# Patient Record
Sex: MALE | Race: WHITE | Employment: OTHER | URBAN - METROPOLITAN AREA
[De-identification: names, ages, dates, MRNs, and addresses within clinical notes are randomized per-mention and may not be internally consistent; named-entity substitution may affect disease eponyms.]

---

## 2017-02-10 ENCOUNTER — HOSPITAL ENCOUNTER (OUTPATIENT)
Dept: DIABETES SERVICES | Age: 67
Discharge: HOME | End: 2017-02-10
Payer: MEDICARE

## 2017-02-10 DIAGNOSIS — E11.9 DIABETES MELLITUS WITHOUT COMPLICATION (HCC): ICD-10-CM

## 2017-02-10 PROCEDURE — G0109 DIAB MANAGE TRN IND/GROUP: HCPCS | Performed by: DIETITIAN, REGISTERED

## 2017-02-24 ENCOUNTER — HOSPITAL ENCOUNTER (OUTPATIENT)
Dept: DIABETES SERVICES | Age: 67
Discharge: HOME | End: 2017-02-24
Payer: MEDICARE

## 2017-02-24 DIAGNOSIS — E11.9 DIABETES MELLITUS WITHOUT COMPLICATION (HCC): ICD-10-CM

## 2017-02-24 PROCEDURE — G0109 DIAB MANAGE TRN IND/GROUP: HCPCS | Performed by: DIETITIAN, REGISTERED

## 2017-04-14 ENCOUNTER — HOSPITAL ENCOUNTER (OUTPATIENT)
Dept: DIABETES SERVICES | Age: 67
Discharge: HOME | End: 2017-04-14
Payer: MEDICARE

## 2017-04-14 DIAGNOSIS — E11.9 DIABETES MELLITUS WITHOUT COMPLICATION (HCC): ICD-10-CM

## 2017-04-14 PROCEDURE — G0109 DIAB MANAGE TRN IND/GROUP: HCPCS | Performed by: DIETITIAN, REGISTERED

## 2017-10-06 ENCOUNTER — HOSPITAL ENCOUNTER (OUTPATIENT)
Dept: DIABETES SERVICES | Age: 67
Discharge: HOME | End: 2017-10-06
Payer: MEDICARE

## 2017-10-06 DIAGNOSIS — E11.9 DIABETES MELLITUS WITHOUT COMPLICATION (HCC): ICD-10-CM

## 2017-10-06 PROCEDURE — G0109 DIAB MANAGE TRN IND/GROUP: HCPCS | Performed by: DIETITIAN, REGISTERED

## 2018-05-01 ENCOUNTER — HOSPITAL ENCOUNTER (OUTPATIENT)
Dept: PHYSICAL THERAPY | Age: 68
Discharge: HOME | End: 2018-05-01
Payer: MEDICARE

## 2018-05-01 PROCEDURE — G8985 CARRY GOAL STATUS: HCPCS

## 2018-05-01 PROCEDURE — 97161 PT EVAL LOW COMPLEX 20 MIN: CPT

## 2018-05-01 PROCEDURE — G8984 CARRY CURRENT STATUS: HCPCS

## 2018-05-01 PROCEDURE — 97110 THERAPEUTIC EXERCISES: CPT

## 2018-05-01 NOTE — PROGRESS NOTES
[facility]  [address]  [place], [address]  Phone: [phone]  Fax: [phone]    Plan of Care/Statement of Necessity for Physical Therapy Services  2-15    Patient name: Colette Gibson  : 1950  Provider#: [id number]  Referral source: Deepti Gabinojose*      Medical/Treatment Diagnosis: Presence of unspecified artificial shoulder joint [Z96.619]  Primary osteoarthritis, unspecified shoulder [M19.019]     Prior Hospitalization: see medical history     Comorbidities: DM II controlled w/ meds, L ankle fx ~ , L knee pain     Prior Level of Function: able to use R UE to reach overhead, lift & carry items, dress, bath, perform household chores, exercise, play guitar, sleep through night w/out pain/limitation   Medications: Verified on Patient Summary List  Start of Care: 2018      Onset Date: DOS 2018   The Plan of Care and following information is based on the information from the initial evaluation.     Assessment/ key information: pt is a 78year old male presents s/p R TSA DOS 2018    Evaluation Complexity History MEDIUM  Complexity : 1-2 comorbidities / personal factors will impact the outcome/ POC ; Examination LOW Complexity : 1-2 Standardized tests and measures addressing body structure, function, activity limitation and / or participation in recreation  ;Presentation LOW Complexity : Stable, uncomplicated  ;Clinical Decision Making MEDIUM Complexity : FOTO score of 26-74  Overall Complexity Rating: LOW     Problem List: pain affecting function, decrease ROM, decrease strength, decrease ADL/ functional abilitiies, decrease activity tolerance and decrease flexibility/ joint mobility   Treatment Plan may include any combination of the following: Therapeutic exercise, Therapeutic activities, Neuromuscular re-education, Physical agent/modality, Manual therapy, Patient education and Self Care training  Patient / Family readiness to learn indicated by: asking questions, trying to perform skills and interest  Persons(s) to be included in education: patient (P)  Barriers to Learning/Limitations: None  Patient Goal (s): get my arm replacement working  Patient Self Reported Health Status: good  Rehabilitation Potential: good    Short Term Goals: To be accomplished in 2-3 weeks:  1) Patient will be independent with HEP  2) Patient will increase R shoulder flex PROM to >/=115 degrees to work toward normalization of motion  3) Patient will report >/= 25% decrease in pain  4) Patient will increase R elbow AROM ext to neutral for normalization of motion   5) Patient will demonstrate understanding/application of ice utilization recommendations     Long Term Goals: To be accomplished in 8-10 weeks:  1) Patient will report >/= 75% decrease in pain  2) Patient will demonstrate independent with modified home/gym program without aggravation of shoulder pain  3) Patient will increase R shoulder flex AROM to >/= 150 deg so that can reach overhead items in closet  4) Patient will increase R shoulder IR behind back AROM to >/= L1 so that can tuck in shirt   5) Patient will increase R shldr ER functional AROM to >/= T3 so that can wash back     Frequency / Duration: Patient to be seen 1-2 times per week for 8-10 weeks. Patient/ Caregiver education and instruction: self care, activity modification, brace/ splint application and exercises    [x]  Plan of care has been reviewed with RC    G-Roselyn (GP)  Carry   Current  CL= 60-79%    Goal  CK= 40-59%      The severity rating is based on clinical judgment and the FOTO Score. Certification Period: 5/1/2018-7/25/2018    Marylou Liao, PT 5/1/2018 7:33 PM    ________________________________________________________________________    I certify that the above Therapy Services are being furnished while the patient is under my care.  I agree with the treatment plan and certify that this therapy is necessary.     [de-identified] Signature:____________________  Date:____________Time: _________

## 2018-05-01 NOTE — PROGRESS NOTES
PT INITIAL EVALUATION NOTE - [facility] 2-15    Patient Name: Ponce Tabor  Date:2018  : 1950  [x]  Patient  Verified  [place]or: [place] MED[place]ARE / Plan: [place] MEDICARE PART A & B / Product Type: Medicare /    In time: 3611 AM  Out time: 1210 PM  Total Treatment Time (min): 65  Total Timed Codes (min): 10  1:1 Treatment Time ([facility] only): 54   Visit #: 1     Treatment Area: Presence of unspecified artificial shoulder joint [Z96.619]  Primary osteoarthritis, unspecified shoulder [M19.019]    SUBJECTIVE  Pain Level (0-10 scale): 4-5/10  Any medication changes, allergies to medications, adverse drug reactions, diagnosis change, or new procedure performed?: [] No    [x] Yes (see summary sheet for update)  Subjective:      Pt presents s/p R TSA 2018, s/p stayed 1 night at [address], saw OT and was given ex for home, been doing daily but unable to kim all ex due to pain; f/u Dr. Elige Romberg 2018, referred to PT, to f/u again ~ 4 weeks     Pt reports cont to take meds regular schedule, typically icing only ~ 1x/day     Describes > 4 year hx of R shldr pain, had ~ 2 injections, sx persisted, saw Dr. Elige Romberg ~ 3/2018, xrays, advised TSA      Pt is R hand dominant     Pain:   9/10 max w/out meds 4-5/10 min 4-5/10 now     Aggravated by movement, activity  Eased by medication, ice   Location of symptoms: R elbow burning (notes elbow and hand swelling), R shldr   Description of symptoms: burning, aching, sharp    Diagnostic Tests: [] Lab work [x] X-rays    [] CT [] MRI     [] Other:  Results (per report of the patient): pre surg    PMH: Significant for DM II controlled w/ meds, L ankle fx ~ , L knee pain       Social/Recreation/Work: limited showering at this time; sleeping lying on back; pt is retired; pt owns a home but stays w/ girlfriend, typically does most of the housework and yardwork but has been unable to do since surgery; prior to worsening of shldr pain silver sneakers [employer] yoga ~ 2x/wk, intends to return, also occas uses bike & TM; pt plays guitar w/ a group but unable to play since surgery       Prior level of function: able to use R UE to reach overhead, lift & carry items, dress, bath, perform household chores, exercise, play guitar, sleep through night w/out pain/limitation     Patient goal(s): \"get my arm replacement working\"       OBJECTIVE/EXAMINATION  Observation:   Pt presents w/ R UE In sling w/ abd pillow   Redness & edema R elbow   Pt is overweight    Posture: Normal: []    Forward Head: [x]   Protracted Shoulders: [x]   Rotated:  [] R    [] L    C Lordosis:              [x] Increased [] Decreased     T Kyphosis:  [x] Increased [] Decreased  L Lordosis:  [] Increased [] Decreased    ROM:  [] Unable to assess at this time                                             AROM                                                                PROM   Right Left  Right Left   Flexion nt 155 Flexion 95 158   Scaption/ABD nt 160 Scaption/ABD nt nt   ER @ 0 Degrees nt 50 ER @ 0 Degrees nt nt   ER @ 90 Degrees nt nt ER @ 45/90 Degrees -5 75   IR @ 90 Degrees nt nt IR @ 45/90 Degrees To abdomen  98   Functional ER nt T4      Functional IR nt T5      Elbow Flexion 120 pain wnl Elbow Flexion 130 pain wnl   Elbow Extension -20 pain wnl Elbow Extension -10 pain wnl     Strength:   [] Unable to assess at this time                                                                           MMT (0-5)    R (1-5) L (1-5)   Shoulder Flexion nt 5   Shoulder Abduction nt 5   Shoulder ER nt 5   Shoulder IR nt 5   Supraspinatus nt 5   Elbow Flexion <3 pain 5   Elbow Extension <3 pain 5     Palpation:   [] Min  [x] Mod  [] Severe    Location: ant shldr at incision, lat acrom border, deltoid    [] Min  [x] Mod  [x] Severe    Location: R UT m., incr m. tone   [] Min  [x] Mod  [x] Severe    Location: R elbow, edema R bursa, incr temp     Outcome Measure: Patient presents with an initial FOTO score of 23/100.        Modality rationale: decrease inflammation and decrease pain to improve the patients ability to use R UE to reach overhead, lift & carry items, dress, bath, perform household chores, exercise, play guitar, sleep through night w/out pain/limitation   Min Type Additional Details    [] Estim: []Att   []Unatt        []TENS instruct                  []IFC  []Premod   []NMES                     []Other:  []w/US   []w/ice   []w/heat  Position:  Location:    []  Traction: [] Cervical       []Lumbar                       [] Prone          []Supine                       []Intermittent   []Continuous Lbs:  [] before manual  [] after manual  []w/heat    []  Ultrasound: []Continuous   [] Pulsed at:                            []1MHz   []3MHz Location:  W/cm2:    []  Paraffin         Location:  []w/heat   10 [x]  Ice     []  Heat  []  Ice massage Position: sup  Location: R shldr & R elbow     []  Laser  []  Other: Position:  Location:    []  Vasopneumatic Device Pressure:       [] lo [] med [] hi   Temperature:    [x] Skin assessment post-treatment:  [x]intact []redness- no adverse reaction    []redness  adverse reaction:     10 min Therapeutic Exercise:  [x] See flow sheet : patient education, instruction HEP    Rationale: increase ROM, increase strength, improve coordination, improve balance and increase proprioception to improve the patients ability to use R UE to reach overhead, lift & carry items, dress, bath, perform household chores, exercise, play guitar, sleep through night w/out pain/limitation          With   [x] TE   [] TA   [] neuro   [] other: Patient Education: [x] Review HEP    [] Progressed/Changed HEP based on:   [x] positioning   [x] body mechanics   [] transfers   [x] heat/ice application    [x] other:  nagi/path, POC and role of PT, activity modification, postural principles, sleeping position, proper sling fitting/wear          Pain Level (0-10 scale) post treatment: 4-5/10      ASSESSMENT:      [x]  See Plan of [address] Jack Castillo, PT 5/1/2018  11:05 AM

## 2018-05-04 ENCOUNTER — HOSPITAL ENCOUNTER (OUTPATIENT)
Dept: PHYSICAL THERAPY | Age: 68
Discharge: HOME | End: 2018-05-04
Payer: MEDICARE

## 2018-05-04 PROCEDURE — 97140 MANUAL THERAPY 1/> REGIONS: CPT

## 2018-05-04 PROCEDURE — 97110 THERAPEUTIC EXERCISES: CPT

## 2018-05-04 NOTE — PROGRESS NOTES
PT DAILY TREATMENT NOTE - [facility] 2-15    Patient Name: Lynne Antunez  Date:2018  : 1950  [x]  Patient  Verified  Payor: [place] MEDICARE / Plan: [place] MEDICARE PART A & B / Product Type: Medicare /    In time:1000  Out time:1045  Total Treatment Time (min): 45  Total Timed Codes (min): 45  1:1 Treatment Time ([facility] only): 39   Visit #: 2     Treatment Area: Presence of unspecified artificial shoulder joint [Z96.619]  Primary osteoarthritis, unspecified shoulder [M19.019]    SUBJECTIVE  Pain Level (0-10 scale): 2  Any medication changes, allergies to medications, adverse drug reactions, diagnosis change, or new procedure performed?: [x] No    [] Yes (see summary sheet for update)  Subjective functional status/changes:   [] No changes reported  S/s consistent with initial visit. Compliant with HEP.   Taking arm out of sling periodically throughout the day    OBJECTIVE    Modality rationale: decrease edema and decrease pain to improve the patients ability to improve motion   Min Type Additional Details    [] Estim: []Att   []Unatt        []TENS instruct                  []IFC  []Premod   []NMES                     []Other:  []w/US   []w/ice   []w/heat  Position:  Location:    []  Traction: [] Cervical       []Lumbar                       [] Prone          []Supine                       []Intermittent   []Continuous Lbs:  [] before manual  [] after manual  []w/heat    []  Ultrasound: []Continuous   [] Pulsed at:                           []1MHz   []3MHz Location:  W/cm2:    [] Paraffin         Location:   []w/heat   15 [x]  Ice     []  Heat  []  Ice massage Position:  Right shoulder  Location:    []  Laser  []  Other: Position:  Location:      []  Vasopneumatic Device Pressure:       [] lo [] med [] hi   Temperature:      [x] Skin assessment post-treatment:  [x]intact []redness- no adverse reaction    []redness  adverse reaction:     15 min Therapeutic Exercise:  [x] See flow sheet :   Rationale: increase ROM to improve the patients ability to improve function      25 min Manual Therapy:   PROM right shoulder flexion  Extension stretch to right elbow   Rationale: increase ROM and increase tissue extensibility to improve the patients ability to improve motion            With   [] TE   [] TA   [] neuro   [] other: Patient Education: [x] Review HEP    [] Progressed/Changed HEP based on:   [] positioning   [] body mechanics   [] transfers   [x] heat/ice application    [] other: LLLD extension stretch for elbow at home. Added self flexion stretch in standing for his shouler     Other Objective/Functional Measures:      Pain Level (0-10 scale) post treatment:  unchanged    ASSESSMENT/Changes in Function:     Patient will continue to benefit from skilled PT services to modify and progress therapeutic interventions, address functional mobility deficits, address ROM deficits, address strength deficits, analyze and address soft tissue restrictions, analyze and cue movement patterns and analyze and modify body mechanics/ergonomics to attain remaining goals.      []  See Plan of Care  []  See progress note/recertification  []  See Discharge Summary         Progress towards goals / Updated goals:      PLAN  []  Upgrade activities as tolerated     [x]  Continue plan of care  []  Update interventions per flow sheet       []  Discharge due to:_  []  Other:_      Bertha Gray, PT 5/4/2018  9:55 AM

## 2018-05-08 ENCOUNTER — HOSPITAL ENCOUNTER (OUTPATIENT)
Dept: PHYSICAL THERAPY | Age: 68
Discharge: HOME | End: 2018-05-08
Payer: MEDICARE

## 2018-05-08 PROCEDURE — 97110 THERAPEUTIC EXERCISES: CPT | Performed by: PHYSICAL THERAPY ASSISTANT

## 2018-05-08 PROCEDURE — 97140 MANUAL THERAPY 1/> REGIONS: CPT | Performed by: PHYSICAL THERAPY ASSISTANT

## 2018-05-08 NOTE — PROGRESS NOTES
PT DAILY TREATMENT NOTE - [facility] 2-15    Patient Name: Jeremy Angelo  Date:2018  : 1950  [x]  Patient  Verified  Payor: [place] MEDICARE / Plan: [place] MEDICARE PART A & B / Product Type: Medicare /    In time:9:30 AM  Out time:1040 AM  Total Treatment Time (min): 70  Total Timed Codes (min): 50  1:1 Treatment Time ( [address] only): 39   Visit #: 3     Treatment Area: Presence of unspecified artificial shoulder joint [Z96.619]  Primary osteoarthritis, unspecified shoulder [M19.019]    SUBJECTIVE  Pain Level (0-10 scale): 5-6/10 R elbow, 2-3 R shoulder   Any medication changes, allergies to medications, adverse drug reactions, diagnosis change, or new procedure performed?: [x] No    [] Yes (see summary sheet for update)  Subjective functional status/changes:   [] No changes reported  Patient reports greatest pain is in his R elbow, he has tried to take off his sling in the afternoon to allow his elbow to straighten. Using ice and pain meds (especially in the afternoon). States he did not take anything this AM due to runnign errands.     OBJECTIVE    Modality rationale: decrease edema and decrease pain to improve the patients ability to improve motion   Min Type Additional Details    [] Estim: []Att   []Unatt        []TENS instruct                  []IFC  []Premod   []NMES                     []Other:  []w/US   []w/ice   []w/heat  Position:  Location:    []  Traction: [] Cervical       []Lumbar                       [] Prone          []Supine                       []Intermittent   []Continuous Lbs:  [] before manual  [] after manual  []w/heat    []  Ultrasound: []Continuous   [] Pulsed at:                           []1MHz   []3MHz Location:  W/cm2:    [] Paraffin         Location:   []w/heat   10  10 [x]  Ice post    [x]  Heat pre  []  Ice massage Position:  Right shoulder  Location:    []  Laser  []  Other: Position:  Location:      []  Vasopneumatic Device Pressure:       [] lo [] med [] hi   Temperature:      [x] Skin assessment post-treatment:  [x]intact []redness- no adverse reaction    []redness  adverse reaction:     20 min Therapeutic Exercise:  [x] See flow sheet : wrist flex/ext, FA pron/sup, elbow flex/ext, UT and Lev. scap stretch   Rationale: increase ROM to improve the patients ability to improve function      30 min Manual Therapy:  STM R UT, lev. Scapula and biceps muscle belly, gentle effleurage to reduce effusion R hand through R elbow, PROM right shoulder flexion  Extension stretch to right elbow   Rationale: increase ROM and increase tissue extensibility to improve the patients ability to improve motion            With   [] TE   [] TA   [] neuro   [] other: Patient Education: [x] Review HEP    [] Progressed/Changed HEP based on:   [] positioning   [] body mechanics   [] transfers   [x] heat/ice application    [] other:     Other Objective/Functional Measures:      Pain Level (0-10 scale) post treatment:  \"frozen\"    ASSESSMENT/Changes in Function:     Reviewed HEP, corrections made for form/technique. Responded well to STM along R UT, lev. Scapula and biceps muscles. Patient will continue to benefit from skilled PT services to modify and progress therapeutic interventions, address functional mobility deficits, address ROM deficits, address strength deficits, analyze and address soft tissue restrictions, analyze and cue movement patterns and analyze and modify body mechanics/ergonomics to attain remaining goals.      []  See Plan of Care  []  See progress note/recertification  []  See Discharge Summary         Progress towards goals / Updated goals:      PLAN  []  Upgrade activities as tolerated     [x]  Continue plan of care  []  Update interventions per flow sheet       []  Discharge due to:_  []  Other:_      Isabel Harper PTA 5/8/2018  9:30 AM

## 2018-05-11 ENCOUNTER — HOSPITAL ENCOUNTER (OUTPATIENT)
Dept: PHYSICAL THERAPY | Age: 68
Discharge: HOME | End: 2018-05-11
Payer: MEDICARE

## 2018-05-11 PROCEDURE — 97140 MANUAL THERAPY 1/> REGIONS: CPT

## 2018-05-11 PROCEDURE — 97110 THERAPEUTIC EXERCISES: CPT

## 2018-05-11 NOTE — PROGRESS NOTES
PT DAILY TREATMENT NOTE - [facility] 2-15    Patient Name: Talbot Gottron  Date:2018  : 1950  [x]  Patient  Verified  Payor: [place] MEDICARE / Plan: [place] MEDICARE PART A & B / Product Type: Medicare /    In time: 2992 AM  Out time: 8073 AM  Total Treatment Time (min): 60  Total Timed Codes (min): 50  1:1 Treatment Time ([facility] only): 40  Visit #: 4    Treatment Area: Presence of unspecified artificial shoulder joint [Z96.619]  Primary osteoarthritis, unspecified shoulder [M19.019]    SUBJECTIVE  Pain Level (0-10 scale): 6-7/10  Any medication changes, allergies to medications, adverse drug reactions, diagnosis change, or new procedure performed?: [x] No    [] Yes (see summary sheet for update)  Subjective functional status/changes:   [] No changes reported  Patient reports continues to have severe pain in R elbow, no relief of sx even w/ pain medication, use of ice; difficulty kim ex at home due to high pain levels     OBJECTIVE    Modality rationale: decrease edema and decrease pain to improve the patients ability to improve motion   Min Type Additional Details    [] Estim: []Att   []Unatt        []TENS instruct                  []IFC  []Premod   []NMES                     []Other:  []w/US   []w/ice   []w/heat  Position:  Location:    []  Traction: [] Cervical       []Lumbar                       [] Prone          []Supine                       []Intermittent   []Continuous Lbs:  [] before manual  [] after manual  []w/heat    []  Ultrasound: []Continuous   [] Pulsed at:                           []1MHz   []3MHz Location:  W/cm2:    [] Paraffin         Location:   []w/heat   10  10 [x]  Ice post    [x]  Heat pre  []  Ice massage Position:  Right shoulder  Location:    []  Laser  []  Other: Position:  Location:      []  Vasopneumatic Device Pressure:       [] lo [] med [] hi   Temperature:      [x] Skin assessment post-treatment:  [x]intact []redness- no adverse reaction    []redness  adverse reaction:     20 min Therapeutic Exercise:  [x] See flow sheet : PREs w/ MH, as per flow sheet    Rationale: increase ROM to improve the patients ability to improve function      30 min Manual Therapy:  STM R UT, lev. Scapula and biceps muscle belly, gentle effleurage to reduce effusion R hand through R elbow, PROM right shoulder flexion, ER, elbow flex/ext    Rationale: increase ROM and increase tissue extensibility to improve the patients ability to improve motion            With   [] TE   [] TA   [] neuro   [] other: Patient Education: [x] Review HEP    [] Progressed/Changed HEP based on:   [] positioning   [] body mechanics   [] transfers   [x] heat/ice application    [] other:     Other Objective/Functional Measures:    Pitting edema R distal humerus, elbow, prox FA  Edema observed R UE in to R hand  signif redness R elbow at olecranon, necrotic tissue (scabbing) observed     Pain Level (0-10 scale) post treatment:  3-4/10    ASSESSMENT/Changes in Function:     Pt w/ report of severe pain levels R elbow today, has been present since initial evaluation but worse today, incr edema noted including pitting edema of tissues, incr redness and necrotic tissue at elbow; decr kim w/ ex today due to incr pain levels    Concern re: change in sx R elbow reg, advised pt to contact MD immediately following appt to discuss sx     Patient will continue to benefit from skilled PT services to modify and progress therapeutic interventions, address functional mobility deficits, address ROM deficits, address strength deficits, analyze and address soft tissue restrictions, analyze and cue movement patterns and analyze and modify body mechanics/ergonomics to attain remaining goals.      []  See Plan of Care  []  See progress note/recertification  []  See Discharge Summary         Progress towards goals / Updated goals:      PLAN  []  Upgrade activities as tolerated     [x]  Continue plan of care  []  Update interventions per flow sheet       [] Discharge due to:_  [x]  Other: pt to contact MD re: elbow pain & change in sx, cont POC as per MD recommendation      Marylou Liao, PT 5/11/2018  10:30 AM

## 2018-05-15 ENCOUNTER — HOSPITAL ENCOUNTER (OUTPATIENT)
Dept: PHYSICAL THERAPY | Age: 68
Discharge: HOME | End: 2018-05-15
Payer: MEDICARE

## 2018-05-15 PROCEDURE — 97110 THERAPEUTIC EXERCISES: CPT | Performed by: PHYSICAL THERAPY ASSISTANT

## 2018-05-15 PROCEDURE — 97140 MANUAL THERAPY 1/> REGIONS: CPT | Performed by: PHYSICAL THERAPY ASSISTANT

## 2018-05-15 NOTE — PROGRESS NOTES
MD NOTE - [facility] 2-15    Patient Name: Sharry Canavan  Date:5/15/2018  : 1950  [x]  Patient  Verified  Payor: Mil[employer]  / Plan: [place] MED[place] & B / Product Type: Medicare /        Treatment Area: Presence of unspecified artificial shoulder joint [Z96.619]  Primary osteoarthritis, unspecified shoulder [M19.019]    SUBJECTIVE  Pain Level (0-10 scale): 4/10  Any medication changes, allergies to medications, adverse drug reactions, diagnosis change, or new procedure performed?: [x] No    [] Yes (see summary sheet for update)  Subjective functional status/changes:   [] No changes reported  Patient reports continues to have severe pain in R elbow, no relief of sx even w/ pain medication, use of ice; difficulty kim ex at home due to high pain levels. He is taking his sling off and allowing his elbow to extend, reports difficulty sleeping at night due to elbow pain (~7-8/10). He also has pain in his shoulder when he does not wear his sling at home (~5/10)     OBJECTIVE            With   [x] TE   [] TA   [] neuro   [] other: Patient Education: [x] Review HEP    [] Progressed/Changed HEP based on:   [] positioning   [] body mechanics   [] transfers   [x] heat/ice application    [x] other: regarding appropriate positioning using pillows/towel rolls to avoid pressure along R elbow, proper sling fitting/wear     Other Objective/Functional Measures:    Pitting edema R distal humerus, elbow, prox FA  Edema observed R UE in to R hand  Redness R elbow at olecranon, Remanence of scab on olecranon    Band-Aid noted distal incision proximal humerus with serosanguinous drainage (states the drainage has increased since last MD appointment)      PROM R Shoulder:  Flexion: 91 deg pain  ER: -5 deg pain    PROM R Elbow extension -36 deg (greatest pain)    Pain Level (0-10 scale) post treatment:  \"more pain in the elbow when I try to straighten it. \"    ASSESSMENT/Changes in Function:   Patient has been seen for 5 skilled PT visits he continues to report severe R elbow pain, significant limitation in R elbow extension ROM as well as increased pitting edema throughout R UE and redness R elbow with remanence of necrotic tissue (scab). His shoulder ROM continues to be painful and limited as well with serosanguinous drainage along distal incision. Patient with decreased tolerance to ROM/exercises due to pain levels. Patient will continue to benefit from skilled PT services to modify and progress therapeutic interventions, address functional mobility deficits, address ROM deficits, address strength deficits, analyze and address soft tissue restrictions, analyze and cue movement patterns and analyze and modify body mechanics/ergonomics to attain remaining goals.      []  See Plan of Care  []  See progress note/recertification  []  See Discharge Summary         Progress towards goals / Updated goals:      PLAN  []  Upgrade activities as tolerated     [x]  Continue plan of care  []  Update interventions per flow sheet       []  Discharge due to:_  [x]  Other:  cont POC as per MD recommendation      Madan Crow, PTA 5/15/2018  9:30 AM

## 2018-05-15 NOTE — PROGRESS NOTES
PT DAILY TREATMENT NOTE - [facility] 2-15    Patient Name: Hood Cardenas  Date:5/15/2018  : 1950  [x]  Patient  Verified  Payor: [place] MEDICARE / Plan: [place] MEDICARE PART A & B / Product Type: Medicare /    In time: 9:30 AM  Out time: 10:30 AM  Total Treatment Time (min): 60  Total Timed Codes (min): 35  1:1 Treatment Time ( [address] only): 35  Visit #: 5    Treatment Area: Presence of unspecified artificial shoulder joint [Z96.619]  Primary osteoarthritis, unspecified shoulder [M19.019]    SUBJECTIVE  Pain Level (0-10 scale): 4/10  Any medication changes, allergies to medications, adverse drug reactions, diagnosis change, or new procedure performed?: [x] No    [] Yes (see summary sheet for update)  Subjective functional status/changes:   [] No changes reported  Patient reports continues to have severe pain in R elbow, no relief of sx even w/ pain medication, use of ice; difficulty kim ex at home due to high pain levels. He is taking his sling off and allowing his elbow to extend, reports difficulty sleeping at night due to elbow pain (~7-8/10).  He also has pain in his shoulder when he does not wear his sling at home (~5/10)     OBJECTIVE    Modality rationale: decrease edema and decrease pain to improve the patients ability to improve motion   Min Type Additional Details    [] Estim: []Att   []Unatt        []TENS instruct                  []IFC  []Premod   []NMES                     []Other:  []w/US   []w/ice   []w/heat  Position:  Location:    []  Traction: [] Cervical       []Lumbar                       [] Prone          []Supine                       []Intermittent   []Continuous Lbs:  [] before manual  [] after manual  []w/heat    []  Ultrasound: []Continuous   [] Pulsed at:                           []1MHz   []3MHz Location:  W/cm2:    [] Paraffin         Location:   []w/heat   15  10 [x]  Ice post    [x]  Heat pre  []  Ice massage Position:  Right shoulder  Location:    []  Laser  []  Other: Position:  Location: []  Vasopneumatic Device Pressure:       [] lo [] med [] hi   Temperature:      [x] Skin assessment post-treatment:  [x]intact []redness- no adverse reaction    []redness  adverse reaction:     10 min Therapeutic Exercise:  [x] See flow sheet : obtained measurements for MD note   Rationale: increase ROM to improve the patients ability to improve function      25 min Manual Therapy:  STM R UT, lev. Scapula and biceps muscle belly, gentle effleurage to reduce effusion R hand through R elbow, PROM right shoulder flexion, ER, elbow flex/ext    Rationale: increase ROM and increase tissue extensibility to improve the patients ability to improve motion            With   [x] TE   [] TA   [] neuro   [] other: Patient Education: [x] Review HEP    [] Progressed/Changed HEP based on:   [] positioning   [] body mechanics   [] transfers   [x] heat/ice application    [x] other: regarding appropriate positioning using pillows/towel rolls to avoid pressure along R elbow, proper sling fitting/wear     Other Objective/Functional Measures:    Pitting edema R distal humerus, elbow, prox FA  Edema observed R UE in to R hand  Redness R elbow at olecranon, Remanence of scab on olecranon    Band-Aid noted distal incision proximal humerus with serosanguinous drainage (states the drainage has increased since last MD appointment)      PROM R Shoulder:  Flexion: 91 deg pain  ER: -5 deg pain    PROM R Elbow extension -36 deg (greatest pain)    Pain Level (0-10 scale) post treatment:  \"more pain in the elbow when I try to straighten it. \"    ASSESSMENT/Changes in Function:   Patient has been seen for 5 skilled PT visits he continues to report severe R elbow pain, significant limitation in R elbow extension ROM as well as increased pitting edema throughout R UE and redness R elbow with remanence of necrotic tissue (scab). His shoulder ROM continues to be painful and limited as well with serosanguinous drainage along distal incision. Patient with decreased tolerance to ROM/exercises due to pain levels. Patient will continue to benefit from skilled PT services to modify and progress therapeutic interventions, address functional mobility deficits, address ROM deficits, address strength deficits, analyze and address soft tissue restrictions, analyze and cue movement patterns and analyze and modify body mechanics/ergonomics to attain remaining goals.      []  See Plan of Care  []  See progress note/recertification  []  See Discharge Summary         Progress towards goals / Updated goals:      PLAN  []  Upgrade activities as tolerated     [x]  Continue plan of care  []  Update interventions per flow sheet       []  Discharge due to:_  [x]  Other:  cont POC as per MD recommendation      Marianne Brock, PTA 5/15/2018  9:30 AM

## 2018-05-18 ENCOUNTER — HOSPITAL ENCOUNTER (OUTPATIENT)
Dept: PHYSICAL THERAPY | Age: 68
Discharge: HOME | End: 2018-05-18
Payer: MEDICARE

## 2018-05-18 PROCEDURE — 97140 MANUAL THERAPY 1/> REGIONS: CPT

## 2018-05-18 PROCEDURE — 97110 THERAPEUTIC EXERCISES: CPT
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
## 2018-05-22 ENCOUNTER — HOSPITAL ENCOUNTER (OUTPATIENT)
Dept: PHYSICAL THERAPY | Age: 68
Discharge: HOME | End: 2018-05-22
Payer: MEDICARE

## 2018-05-22 PROCEDURE — 97110 THERAPEUTIC EXERCISES: CPT | Performed by: PHYSICAL THERAPY ASSISTANT

## 2018-05-22 PROCEDURE — 97140 MANUAL THERAPY 1/> REGIONS: CPT | Performed by: PHYSICAL THERAPY ASSISTANT

## 2018-05-22 NOTE — PROGRESS NOTES
PT DAILY TREATMENT NOTE - [facility] 2-15    Patient Name: Hood Cardenas  Date:2018  : 1950  [x]  Patient  Verified  Payor: [place] MEDICARE / Plan: [place] MEDICARE PART A & B / Product Type: Medicare /    In time: 11:05 AM  Out time: 12:00 PM  Total Treatment Time (min): 55  Total Timed Codes (min): 35  1:1 Treatment Time ([facility] only): 25  Viisit #: 7    Treatment Area: Presence of unspecified artificial shoulder joint [Z96.619]  Primary osteoarthritis, unspecified shoulder [M19.019]    SUBJECTIVE  Pain Level (0-10 scale): 6/10  Any medication changes, allergies to medications, adverse drug reactions, diagnosis change, or new procedure performed?: [x] No    [] Yes (see summary sheet for update)  Subjective functional status/changes:   [] No changes reported  Patient reporting \"to be honest I wish that I didn't even have the surgery, my shoulder hurts more now than what it did before the surgery. \" Patient also stating \" I feel like I want to go home and take a pain pill because the pain is so bad. \" States he was told at his MD visit that he no longer needed to wear his abduction pillow, he was prescribed a lower dose pain medication and advised to take Tylenol.   Patient also stating he is only 1x/day \"sometimes\"    OBJECTIVE    Modality rationale: decrease edema and decrease pain to improve the patients ability to improve motion   Min Type Additional Details    [] Estim: []Att   []Unatt        []TENS instruct                  []IFC  []Premod   []NMES                     []Other:  []w/US   []w/ice   []w/heat  Position:  Location:    []  Traction: [] Cervical       []Lumbar                       [] Prone          []Supine                       []Intermittent   []Continuous Lbs:  [] before manual  [] after manual  []w/heat    []  Ultrasound: []Continuous   [] Pulsed at:                           []1MHz   []3MHz Location:  W/cm2:    [] Paraffin         Location:   []w/heat   10  10 [x]  Ice post    [x]  Heat pre  []  Ice massage Position:  Right shoulder  Location:    []  Laser  []  Other: Position:  Location:      []  Vasopneumatic Device Pressure:       [] lo [] med [] hi   Temperature:      [x] Skin assessment post-treatment:  [x]intact []redness- no adverse reaction    []redness  adverse reaction:     20 min Therapeutic Exercise:  [x] See flow sheet : PREs w/ MH   Rationale: increase ROM to improve the patients ability to improve function      15 min Manual Therapy:  STM R UT, lev. Scapula and biceps muscle belly, gentle effleurage to reduce effusion R hand through R elbow, PROM right shoulder flexion, ER, elbow flex/ext    Rationale: increase ROM and increase tissue extensibility to improve the patients ability to improve motion            With   [x] TE   [] TA   [] neuro   [] other: Patient Education: [x] Review HEP    [] Progressed/Changed HEP based on:   [] positioning   [] body mechanics   [] transfers   [x] heat/ice application    [x] other: regarding appropriate positioning using pillows/towel rolls to avoid pressure along R elbow, proper sling fitting/wear, advised patient that he is to remain PROM right now other than pulleys that was just initiated last session. Also discussion as to applying ice at least 3x/day due to amount of swelling, pain and inflammation in R shoulder until symptoms decrease. Other Objective/Functional Measures:    Patient without wearing sling to PT session  Observed patient performing active shoulder scaption on R to approx 10-15 degrees    Pitting edema R distal humerus, elbow, prox FA  Edema observed R UE in to R hand  Mild redness R elbow at olecranon, necrotic tissue (scabbing) observed     Pain Level (0-10 scale) post treatment: 0/10 at rest     ASSESSMENT/Changes in Function:   Patient with conflicting stories in regards to wearing abduction pillow with his sling. Presents with increased swelling in R UE, higher pain levels and decreased tolerance to manual therapy. Significant time educating patient on ice application, appropriate sling wear and continuing to remain PROM at this time to allow his R shoulder to heal.        Patient will continue to benefit from skilled PT services to modify and progress therapeutic interventions, address functional mobility deficits, address ROM deficits, address strength deficits, analyze and address soft tissue restrictions, analyze and cue movement patterns and analyze and modify body mechanics/ergonomics to attain remaining goals.      [x]  See Plan of Care  []  See progress note/recertification  []  See Discharge Summary         Progress towards goals / Updated goals:      PLAN  []  Upgrade activities as tolerated     [x]  Continue plan of care  []  Update interventions per flow sheet       []  Discharge due to:_  []  Other:      Jordy Eisenberg PTA 5/22/2018  11:00 AM

## 2018-05-25 ENCOUNTER — HOSPITAL ENCOUNTER (OUTPATIENT)
Dept: PHYSICAL THERAPY | Age: 68
Discharge: HOME | End: 2018-05-25
Payer: MEDICARE

## 2018-05-25 PROCEDURE — 97140 MANUAL THERAPY 1/> REGIONS: CPT | Performed by: PHYSICAL THERAPY ASSISTANT

## 2018-05-25 NOTE — PROGRESS NOTES
PT DAILY TREATMENT NOTE - [facility] 2-15    Patient Name: Sushma Pierre  Date:2018  : 1950  [x]  Patient  Verified  Payor: VA MEDICARE / Plan: VA MEDICARE PART A & B / Product Type: Medicare /    In time: 8:00  AM  Out time: 8:50 AM  Total Treatment Time (min): 50  Total Timed Codes (min): 40  1:1 Treatment Time ([facility] only): 25  Viisit #: 8    Treatment Area: Presence of unspecified artificial shoulder joint [Z96.619]  Primary osteoarthritis, unspecified shoulder [M19.019]    SUBJECTIVE  Pain Level (0-10 scale): 7-8/10  Any medication changes, allergies to medications, adverse drug reactions, diagnosis change, or new procedure performed?: [x] No    [] Yes (see summary sheet for update)  Subjective functional status/changes:   [] No changes reported  Patient reports decreased R elbow pain since yesterday and noting greater pain in his R shoulder. He did not take any pain medication prior to PT session  (Took pain meds last night) States he has increased icing since last appointment. Patient states he went to a [employer] yesterday, sat for a few hours, wore his sling. States greatest pain is when he removes his sling. Later in session patient states he is going to a wedding \"I don't plan on wearing my sling for that. \"     OBJECTIVE    Modality rationale: decrease edema and decrease pain to improve the patients ability to improve motion   Min Type Additional Details    [] Estim: []Att   []Unatt        []TENS instruct                  []IFC  []Premod   []NMES                     []Other:  []w/US   []w/ice   []w/heat  Position:  Location:    []  Traction: [] Cervical       []Lumbar                       [] Prone          []Supine                       []Intermittent   []Continuous Lbs:  [] before manual  [] after manual  []w/heat    []  Ultrasound: []Continuous   [] Pulsed at:                           []1MHz   []3MHz Location:  W/cm2:    [] Paraffin         Location:   []w/heat   10  10 [x]  Ice post [x]  Heat pre  []  Ice massage Position:  Right shoulder  Location:    []  Laser  []  Other: Position:  Location:      []  Vasopneumatic Device Pressure:       [] lo [] med [] hi   Temperature:      [x] Skin assessment post-treatment:  [x]intact []redness- no adverse reaction    []redness  adverse reaction:     15 min Therapeutic Exercise:  [x] See flow sheet : PREs w/ MH   Rationale: increase ROM to improve the patients ability to improve function      25 min Manual Therapy:  STM R UT, lev. Scapula and biceps muscle belly, gentle effleurage to reduce effusion R hand through R elbow, PROM right shoulder flexion, ER, elbow flex/ext    Rationale: increase ROM and increase tissue extensibility to improve the patients ability to improve motion            With   [x] TE   [] TA   [] neuro   [] other: Patient Education: [x] Review HEP    [] Progressed/Changed HEP based on:   [] positioning   [] body mechanics   [] transfers   [x] heat/ice application    [x] other:strongly advised patient to wear his sling while at the wedding and when out in the community to avoid irritation to R shoulder and to make other people aware of his surgery to protect his shoulder. Other Objective/Functional Measures:  nt    Pain Level (0-10 scale) post treatment: 0/10 at rest     ASSESSMENT/Changes in Function:   Patient continues to present with high pain levels, pitting edema throughout R UE and decreased tolerance to therapeutic interventions. Strongly encouraged patient to wear his sling while away at a wedding this weekend. Patient will continue to benefit from skilled PT services to modify and progress therapeutic interventions, address functional mobility deficits, address ROM deficits, address strength deficits, analyze and address soft tissue restrictions, analyze and cue movement patterns and analyze and modify body mechanics/ergonomics to attain remaining goals.      [x]  See Plan of Care  []  See progress note/recertification  []  See Discharge Summary         Progress towards goals / Updated goals:      PLAN  []  Upgrade activities as tolerated     [x]  Continue plan of care  []  Update interventions per flow sheet       []  Discharge due to:_  []  Other:      Hakeem Smart, RC 5/25/2018  8:00 AM

## 2018-05-31 ENCOUNTER — HOSPITAL ENCOUNTER (OUTPATIENT)
Dept: PHYSICAL THERAPY | Age: 68
Discharge: HOME | End: 2018-05-31
Payer: MEDICARE

## 2018-05-31 PROCEDURE — 97140 MANUAL THERAPY 1/> REGIONS: CPT

## 2018-05-31 PROCEDURE — 97110 THERAPEUTIC EXERCISES: CPT

## 2018-05-31 NOTE — PROGRESS NOTES
PT DAILY TREATMENT NOTE - [facility] 2-15    Patient Name: Noemi Linder  Date:2018  : 1950  [x]  Patient  Verified  [place]or: [place] MED[place] / Plan: [place] MEDICARE PART A & B / Product Type: Medicare /    In time: 8:00  AM  Out time: 8:50 AM  Total Treatment Time (min): 50  Total Timed Codes (min): 40  1:1 Treatment Time (MC only): 25  Viisit #: 9    Treatment Area: Presence of unspecified artificial shoulder joint [Z96.619]  Primary osteoarthritis, unspecified shoulder [M19.019]    SUBJECTIVE  Pain Level (0-10 scale): 7-8/10  Any medication changes, allergies to medications, adverse drug reactions, diagnosis change, or new procedure performed?: [x] No    [] Yes (see summary sheet for update)  Subjective functional status/changes:   [] No changes reported  Saw MD, advised him to remove the sling. Next MD follow up in late July.       OBJECTIVE    Modality rationale: decrease edema and decrease pain to improve the patients ability to improve motion   Min Type Additional Details    [] Estim: []Att   []Unatt        []TENS instruct                  []IFC  []Premod   []NMES                     []Other:  []w/US   []w/ice   []w/heat  Position:  Location:    []  Traction: [] Cervical       []Lumbar                       [] Prone          []Supine                       []Intermittent   []Continuous Lbs:  [] before manual  [] after manual  []w/heat    []  Ultrasound: []Continuous   [] Pulsed at:                           []1MHz   []3MHz Location:  W/cm2:    [] Paraffin         Location:   []w/heat   10  10 [x]  Ice post    [x]  Heat pre  []  Ice massage Position:  Right shoulder  Location:    []  Laser  []  Other: Position:  Location:      []  Vasopneumatic Device Pressure:       [] lo [] med [] hi   Temperature:      [x] Skin assessment post-treatment:  [x]intact []redness- no adverse reaction    []redness  adverse reaction:     25 min Therapeutic Exercise:  [x] See flow sheet :   Advanced program per flow sheet Rationale: increase ROM to improve the patients ability to improve function      15 min Manual Therapy:  Gh oscillations. PROM into right shoulder flexion, ER within allowable limits. Light isotonic IR/ER with manual resistance. Rationale: increase ROM and increase tissue extensibility to improve the patients ability to improve motion            With   [x] TE   [] TA   [] neuro   [] other: Patient Education: [x] Review HEP    [] Progressed/Changed HEP based on:   [] positioning   [] body mechanics   [] transfers   [x] heat/ice application    [x] other:advised him to purchase pulleys for home use. He has a cane for supine flexion and standing flexion exercises. Other Objective/Functional Measures:  nt    Pain Level (0-10 scale) post treatment: 0/10 at rest     ASSESSMENT/Changes in Function:   Tolerated advances OK. Need to get more aggressive with motion and strengthening exercises as he is 7+ weeks PO now. Patient will continue to benefit from skilled PT services to modify and progress therapeutic interventions, address functional mobility deficits, address ROM deficits, address strength deficits, analyze and address soft tissue restrictions, analyze and cue movement patterns and analyze and modify body mechanics/ergonomics to attain remaining goals.      [x]  See Plan of Care  []  See progress note/recertification  []  See Discharge Summary         Progress towards goals / Updated goals:      PLAN  []  Upgrade activities as tolerated     [x]  Continue plan of care  []  Update interventions per flow sheet       []  Discharge due to:_  []  Other:      Annabelle Medina PT 5/31/2018  8:00 AM

## 2018-06-05 ENCOUNTER — HOSPITAL ENCOUNTER (OUTPATIENT)
Dept: PHYSICAL THERAPY | Age: 68
Discharge: HOME | End: 2018-06-05
Payer: MEDICARE

## 2018-06-05 PROCEDURE — 97110 THERAPEUTIC EXERCISES: CPT

## 2018-06-05 PROCEDURE — 97140 MANUAL THERAPY 1/> REGIONS: CPT

## 2018-06-05 PROCEDURE — G8985 CARRY GOAL STATUS: HCPCS

## 2018-06-05 PROCEDURE — G8984 CARRY CURRENT STATUS: HCPCS

## 2018-06-05 NOTE — PROGRESS NOTES
PT DAILY TREATMENT NOTE/PROGRESS NOTE - [facility] 2-15    Patient Name: Nimesh Ball  Date:2018  : 1950  [x]  Patient  Verified  Payor: [place] MEDICARE / Plan: [place] MEDICARE PART A & B / Product Type: Medicare /    In time: 120 PM  Out time: 235 PM  Total Treatment Time (min): 75  Total Timed Codes (min): 55  1:1 Treatment Time (MC only): 40  Viisit #: 10    Treatment Area: Presence of unspecified artificial shoulder joint [Z96.619]  Primary osteoarthritis, unspecified shoulder [M19.019]    SUBJECTIVE  Pain Level (0-10 scale): 7/10  Any medication changes, allergies to medications, adverse drug reactions, diagnosis change, or new procedure performed?: [x] No    [] Yes (see summary sheet for update)  Subjective functional status/changes:   [] No changes reported  Pt reports cont high pain levels, experienced incr pain after last visit \"think may have done too many of those bands\"     OBJECTIVE    Modality rationale: decrease edema and decrease pain to improve the patients ability to improve motion   Min Type Additional Details    [] Estim: []Att   []Unatt        []TENS instruct                  []IFC  []Premod   []NMES                     []Other:  []w/US   []w/ice   []w/heat  Position:  Location:    []  Traction: [] Cervical       []Lumbar                       [] Prone          []Supine                       []Intermittent   []Continuous Lbs:  [] before manual  [] after manual  []w/heat    []  Ultrasound: []Continuous   [] Pulsed at:                           []1MHz   []3MHz Location:  W/cm2:    [] Paraffin         Location:   []w/heat   10  10 [x]  Ice post    [x]  Heat pre  []  Ice massage Position:  Right shoulder  Location:    []  Laser  []  Other: Position:  Location:      []  Vasopneumatic Device Pressure:       [] lo [] med [] hi   Temperature:      [x] Skin assessment post-treatment:  [x]intact []redness- no adverse reaction    []redness  adverse reaction:     40 min Therapeutic Exercise:  [x] See flow sheet :  Add PG incline table walk out, reassessment performed   Rationale: increase ROM to improve the patients ability to improve function      15 min Manual Therapy:  Gh oscillations. PROM into right shoulder flexion, ER within allowable limits. Light isotonic IR/ER with manual resistance. Rationale: increase ROM and increase tissue extensibility to improve the patients ability to improve motion            With   [x] TE   [] TA   [] neuro   [] other: Patient Education: [x] Review HEP    [] Progressed/Changed HEP based on:   [] positioning   [] body mechanics   [] transfers   [x] heat/ice application    [x] other:advised him to purchase pulleys for home use. He has a cane for supine flexion and standing flexion exercises. Other Objective/Functional Measures:      ROM:  [] Unable to assess at this time                                             AROM                                                                PROM    Right Left   Right Left   Flexion 71 155 Flexion 95 158   Scaption/ABD 58 160 Scaption/ABD nt nt   ER @ 0 Degrees 10 50 ER @ 0 Degrees nt nt   ER @ 90 Degrees nt nt ER @ 45/90 Degrees -5 75   IR @ 90 Degrees nt nt IR @ 45/90 Degrees To abdomen  98   Functional ER R clavicle T4         Functional IR nt T5         Elbow Flexion 145 pain wnl Elbow Flexion 145 pain wnl   Elbow Extension -18 pain wnl Elbow Extension -10 pain wnl      Strength:   [] Unable to assess at this time                                                                           MMT (0-5)     R (1-5) L (1-5)   Shoulder Flexion <3 pain 5   Shoulder Abduction <3 pain 5   Shoulder ER <3 pain 5   Shoulder IR <3 pain 5   Supraspinatus <3 pain 5   Elbow Flexion <3 pain 5   Elbow Extension <3 pain 5      Palpation:   [] Min  [x] Mod  [] Severe    Location: lat acrom border, deltoid    [] Min  [x] Mod  [] Severe    Location: R UT, infraspinatus, subscap mm., incr m.  Tone/banding       Outcome Measure: Patient presents with a FOTO score of 36/100 (initial score 23/100)     G-Codes (GP)    Carry   Current  CL= 60-79%    Goal  CJ= 20-39%      Pain Level (0-10 scale) post treatment: 0/10 at rest     ASSESSMENT/Changes in Function:     Patient has been seen for 10 skilled PT visits s/p R TSA; pt presents w/ increase PROM, AROM, increased strength, increased functional use, decr report of pain, decr tenderness to palpation; pt cont to present w/ high pain levels which limits kim w/ HEP and treatment sessions contributing to continued stiffness of his R shoulder    Patient will continue to benefit from skilled PT services to modify and progress therapeutic interventions, address functional mobility deficits, address ROM deficits, address strength deficits, analyze and address soft tissue restrictions, analyze and cue movement patterns and analyze and modify body mechanics/ergonomics to attain remaining goals. [x]  See Plan of Care  []  See progress note/recertification  []  See Discharge Summary         Progress towards goals / Updated goals:  Short Term Goals: To be accomplished in 2-3 weeks:  1) Patient will be independent with HEP met  2) Patient will increase R shoulder flex PROM to >/=115 degrees to work toward normalization of motion not met  3) Patient will report >/= 25% decrease in pain met  4) Patient will increase R elbow AROM ext to neutral for normalization of motion not met  5) Patient will demonstrate understanding/application of ice utilization recommendations partially met      Long Term Goals:  To be accomplished in 8-10 weeks:  1) Patient will report >/= 75% decrease in pain  2) Patient will demonstrate independent with modified home/gym program without aggravation of shoulder pain  3) Patient will increase R shoulder flex AROM to >/= 150 deg so that can reach overhead items in closet  4) Patient will increase R shoulder IR behind back AROM to >/= L1 so that can tuck in shirt   5) Patient will increase R shldr ER functional AROM to >/= T3 so that can wash back     PLAN  []  Upgrade activities as tolerated     [x]  Continue plan of care  []  Update interventions per flow sheet       []  Discharge due to:_  [x]  Other:  Cont PT 2x/wk to increase ROM, strength, functional use CHEIKH Perea, PT 6/5/2018  141 PM

## 2018-06-07 ENCOUNTER — HOSPITAL ENCOUNTER (OUTPATIENT)
Dept: PHYSICAL THERAPY | Age: 68
Discharge: HOME | End: 2018-06-07
Payer: MEDICARE

## 2018-06-07 PROCEDURE — 97110 THERAPEUTIC EXERCISES: CPT

## 2018-06-07 PROCEDURE — 97140 MANUAL THERAPY 1/> REGIONS: CPT

## 2018-06-07 NOTE — PROGRESS NOTES
PT DAILY TREATMENT NOTE - [facility] 2-15    Patient Name: Gregorio Common  Date:2018  : 1950  [x]  Patient  Verified  Payor: [place] MEDICARE / Plan: [place] MEDICARE PART A & B / Product Type: Medicare /    In time: 80 AM  Out time: 1050 AM  Total Treatment Time (min): 80  Total Timed Codes (min): 65  1:1 Treatment Time (MC only): 40  Viisit #: 11    Treatment Area: Presence of unspecified artificial shoulder joint [Z96.619]  Primary osteoarthritis, unspecified shoulder [M19.019]    SUBJECTIVE  Pain Level (0-10 scale): 0/10  Any medication changes, allergies to medications, adverse drug reactions, diagnosis change, or new procedure performed?: [x] No    [] Yes (see summary sheet for update)  Subjective functional status/changes:   [] No changes reported  Patient reports no pain currently at rest     OBJECTIVE    Modality rationale: decrease edema and decrease pain to improve the patients ability to improve motion   Min Type Additional Details    [] Estim: []Att   []Unatt        []TENS instruct                  []IFC  []Premod   []NMES                     []Other:  []w/US   []w/ice   []w/heat  Position:  Location:    []  Traction: [] Cervical       []Lumbar                       [] Prone          []Supine                       []Intermittent   []Continuous Lbs:  [] before manual  [] after manual  []w/heat    []  Ultrasound: []Continuous   [] Pulsed at:                           []1MHz   []3MHz Location:  W/cm2:    [] Paraffin         Location:   []w/heat   10  10 [x]  Ice post    [x]  Heat pre w/ PREs  []  Ice massage Position:  Right shoulder  Location:    []  Laser  []  Other: Position:  Location:      []  Vasopneumatic Device Pressure:       [] lo [] med [] hi   Temperature:      [x] Skin assessment post-treatment:  [x]intact []redness- no adverse reaction    []redness  adverse reaction:     45 min Therapeutic Exercise:  [x] See flow sheet : PREs w/ MH   Rationale: increase ROM to improve the patients ability to improve function      20 min Manual Therapy:  STM R UT, lev. Scapula, infraspinatus & subscap mm., PROM right shoulder flexion, ER, elbow ext    Rationale: increase ROM and increase tissue extensibility to improve the patients ability to improve motion            With   [x] TE   [] TA   [] neuro   [] other: Patient Education: [x] Review HEP    [] Progressed/Changed HEP based on:   [] positioning   [] body mechanics   [] transfers   [x] heat/ice application    [x] other:strongly advised patient to wear his sling while at the wedding and when out in the community to avoid irritation to R shoulder and to make other people aware of his surgery to protect his shoulder. Other Objective/Functional Measures:  nt    Pain Level (0-10 scale) post treatment: 0/10 at rest     ASSESSMENT/Changes in Function:     Minimal tenderness and edema today R elbow; PROM cont to be limited by pain and guarding; cueing to avoid shoulder hike w/ shldr flex exercises     Patient will continue to benefit from skilled PT services to modify and progress therapeutic interventions, address functional mobility deficits, address ROM deficits, address strength deficits, analyze and address soft tissue restrictions, analyze and cue movement patterns and analyze and modify body mechanics/ergonomics to attain remaining goals.      [x]  See Plan of Care  []  See progress note/recertification  []  See Discharge Summary         Progress towards goals / Updated goals:      PLAN  []  Upgrade activities as tolerated     [x]  Continue plan of care  []  Update interventions per flow sheet       []  Discharge due to:_  []  Other:      Nilesh Elliott, PT 6/7/2018  940 AM

## 2018-06-12 ENCOUNTER — HOSPITAL ENCOUNTER (OUTPATIENT)
Dept: PHYSICAL THERAPY | Age: 68
Discharge: HOME | End: 2018-06-12
Payer: MEDICARE

## 2018-06-12 PROCEDURE — 97110 THERAPEUTIC EXERCISES: CPT | Performed by: PHYSICAL THERAPY ASSISTANT

## 2018-06-12 PROCEDURE — 97140 MANUAL THERAPY 1/> REGIONS: CPT | Performed by: PHYSICAL THERAPY ASSISTANT

## 2018-06-12 NOTE — PROGRESS NOTES
PT DAILY TREATMENT NOTE - [facility] 2-15    Patient Name: Quan Kellogg  Date:2018  : 1950  [x]  Patient  Verified  Payor: [place] MEDICARE / Plan: [place] MEDICARE PART A & B / Product Type: Medicare /    In time: 8:35 AM  Out time: 950 AM  Total Treatment Time (min): 75  Total Timed Codes (min): 65  1:1 Treatment Time (MC only): 40  Viisit #: 12    Treatment Area: Presence of unspecified artificial shoulder joint [Z96.619]  Primary osteoarthritis, unspecified shoulder [M19.019]    SUBJECTIVE  Pain Level (0-10 scale): 0/10  Any medication changes, allergies to medications, adverse drug reactions, diagnosis change, or new procedure performed?: [x] No    [] Yes (see summary sheet for update)  Subjective functional status/changes:   [] No changes reported  Patient reports \"There's some clicking in my shoulder but it doesn't hurt. \"      OBJECTIVE    Modality rationale: decrease edema and decrease pain to improve the patients ability to improve motion   Min Type Additional Details    [] Estim: []Att   []Unatt        []TENS instruct                  []IFC  []Premod   []NMES                     []Other:  []w/US   []w/ice   []w/heat  Position:  Location:    []  Traction: [] Cervical       []Lumbar                       [] Prone          []Supine                       []Intermittent   []Continuous Lbs:  [] before manual  [] after manual  []w/heat    []  Ultrasound: []Continuous   [] Pulsed at:                           []1MHz   []3MHz Location:  W/cm2:    [] Paraffin         Location:   []w/heat   10   [x]  Ice post    [x]  Heat pre w/ PREs  []  Ice massage Position:  Right shoulder  Location:    []  Laser  []  Other: Position:  Location:      []  Vasopneumatic Device Pressure:       [] lo [] med [] hi   Temperature:      [x] Skin assessment post-treatment:  [x]intact []redness- no adverse reaction    []redness  adverse reaction:     50 min Therapeutic Exercise:  [x] See flow sheet : PREs w/ MH   Rationale: increase ROM to improve the patients ability to improve function      15 min Manual Therapy:  STM R UT, lev. Scapula, infraspinatus & subscap mm., PROM right shoulder flexion, ER, elbow ext    Rationale: increase ROM and increase tissue extensibility to improve the patients ability to improve motion            With   [x] TE   [] TA   [] neuro   [] other: Patient Education: [x] Review HEP    [] Progressed/Changed HEP based on:   [] positioning   [] body mechanics   [] transfers   [x] heat/ice application    [x] other:      Other Objective/Functional Measures:  PROM R shoulder flexion 110 degrees, ER 10 degrees    Pain Level (0-10 scale) post treatment: 0/10 at rest     ASSESSMENT/Changes in Function:    PROM slowly improving but continues to be guarded and painful. Tolerated strengthening exercises without complaints. Patient will continue to benefit from skilled PT services to modify and progress therapeutic interventions, address functional mobility deficits, address ROM deficits, address strength deficits, analyze and address soft tissue restrictions, analyze and cue movement patterns and analyze and modify body mechanics/ergonomics to attain remaining goals.      [x]  See Plan of Care  []  See progress note/recertification  []  See Discharge Summary         Progress towards goals / Updated goals:      PLAN  []  Upgrade activities as tolerated     [x]  Continue plan of care  []  Update interventions per flow sheet       []  Discharge due to:_  []  Other:      aZna Dyre PTA 6/12/2018  8:35 AM

## 2018-06-14 ENCOUNTER — HOSPITAL ENCOUNTER (OUTPATIENT)
Dept: PHYSICAL THERAPY | Age: 68
Discharge: HOME | End: 2018-06-14
Payer: MEDICARE

## 2018-06-14 PROCEDURE — 97140 MANUAL THERAPY 1/> REGIONS: CPT

## 2018-06-14 PROCEDURE — 97110 THERAPEUTIC EXERCISES: CPT

## 2018-06-19 ENCOUNTER — HOSPITAL ENCOUNTER (OUTPATIENT)
Dept: PHYSICAL THERAPY | Age: 68
Discharge: HOME | End: 2018-06-19
Payer: MEDICARE

## 2018-06-19 PROCEDURE — 97110 THERAPEUTIC EXERCISES: CPT | Performed by: PHYSICAL THERAPY ASSISTANT

## 2018-06-19 PROCEDURE — 97140 MANUAL THERAPY 1/> REGIONS: CPT | Performed by: PHYSICAL THERAPY ASSISTANT

## 2018-06-19 NOTE — PROGRESS NOTES
PT DAILY TREATMENT NOTE - [facility] 2-15    Patient Name: Jacquenette Frankel  Date:2018  : 1950  [x]  Patient  Verified  Payor: [place] MEDICARE / Plan: [place] MEDICARE PART A & B / Product Type: Medicare /    In time: 9:00 AM  Out time: 10:20 AM  Total Treatment Time (min): 80  Total Timed Codes (min): 60  1:1 Treatment Time (MC only): 40  Viisit #: 14    Treatment Area: Presence of unspecified artificial shoulder joint [Z96.619]  Primary osteoarthritis, unspecified shoulder [M19.019]    SUBJECTIVE  Pain Level (0-10 scale): 0/10  Any medication changes, allergies to medications, adverse drug reactions, diagnosis change, or new procedure performed?: [x] No    [] Yes (see summary sheet for update)  Subjective functional status/changes:   [] No changes reported  Patient reports increased pain after last visit, difficulty sleeping, had to take a pain pill in the middle of the night to help him sleep. States last night he was unable to sleep due to feeling really sore so he took a pain pill and drank a glass of wine.      OBJECTIVE    Modality rationale: decrease edema and decrease pain to improve the patients ability to improve motion   Min Type Additional Details    [] Estim: []Att   []Unatt        []TENS instruct                  []IFC  []Premod   []NMES                     []Other:  []w/US   []w/ice   []w/heat  Position:  Location:    []  Traction: [] Cervical       []Lumbar                       [] Prone          []Supine                       []Intermittent   []Continuous Lbs:  [] before manual  [] after manual  []w/heat    []  Ultrasound: []Continuous   [] Pulsed at:                           []1MHz   []3MHz Location:  W/cm2:    [] Paraffin         Location:   []w/heat   10  10 [x]  Ice post    [x]  Heat pre w/ PREs  []  Ice massage Position:  Seated   Location: R shldr     []  Laser  []  Other: Position:  Location:      []  Vasopneumatic Device Pressure:       [] lo [] med [] hi   Temperature:      [x] Skin assessment post-treatment:  [x]intact []redness- no adverse reaction    []redness  adverse reaction:     45 min Therapeutic Exercise:  [x] See flow sheet : PREs w/ MH   Rationale: increase ROM to improve the patients ability to improve function      15 min Manual Therapy:  STM R UT, lev. Scapula, infraspinatus & subscap mm., PROM right shoulder flexion, ER, elbow ext    Rationale: increase ROM and increase tissue extensibility to improve the patients ability to improve motion            With   [x] TE   [] TA   [] neuro   [] other: Patient Education: [x] Review HEP    [] Progressed/Changed HEP based on:   [] positioning   [] body mechanics   [] transfers   [x] heat/ice application    [x] other:      Other Objective/Functional Measures:    nt    Pain Level (0-10 scale) post treatment: 0/10     ASSESSMENT/Changes in Function:     Max cues to avoid UT activation during standing cane flexion, used mirror for visual feedback to improve posture. Also continued difficulty performing isometric ER due to weakness , cues for scapula stabilization and avoid UT activation as well. Patient will continue to benefit from skilled PT services to modify and progress therapeutic interventions, address functional mobility deficits, address ROM deficits, address strength deficits, analyze and address soft tissue restrictions, analyze and cue movement patterns and analyze and modify body mechanics/ergonomics to attain remaining goals.      [x]  See Plan of Care  []  See progress note/recertification  []  See Discharge Summary         Progress towards goals / Updated goals:  nt    PLAN  []  Upgrade activities as tolerated     [x]  Continue plan of care  []  Update interventions per flow sheet       []  Discharge due to:_  []  Other:      Jordan Mitchell, RC 6/19/2018  9:00 AM

## 2018-06-21 ENCOUNTER — HOSPITAL ENCOUNTER (OUTPATIENT)
Dept: PHYSICAL THERAPY | Age: 68
Discharge: HOME | End: 2018-06-21
Payer: MEDICARE

## 2018-06-21 PROCEDURE — 97110 THERAPEUTIC EXERCISES: CPT | Performed by: PHYSICAL THERAPY ASSISTANT

## 2018-06-21 PROCEDURE — 97140 MANUAL THERAPY 1/> REGIONS: CPT | Performed by: PHYSICAL THERAPY ASSISTANT

## 2018-06-21 NOTE — PROGRESS NOTES
PT DAILY TREATMENT NOTE - [facility] 2-15    Patient Name: Rehana Byrne  Date:2018  : 1950  [x]  Patient  Verified  Payor: [place] MED[place]ARE / Plan: [place] MEDICARE PART A & B / Product Type: Medicare /    In time: 8:50 AM  Out time: 10:15 AM  Total Treatment Time (min): 85  Total Timed Codes (min): 65  1:1 Treatment Time (MC only): 54  Viisit #: 15    Treatment Area: Presence of unspecified artificial shoulder joint [Z96.619]  Primary osteoarthritis, unspecified shoulder [M19.019]    SUBJECTIVE  Pain Level (0-10 scale): 0/10  Any medication changes, allergies to medications, adverse drug reactions, diagnosis change, or new procedure performed?: [x] No    [] Yes (see summary sheet for update)  Subjective functional status/changes:   [] No changes reported  Patient reports \"the same pain if I do too much. \"    OBJECTIVE    Modality rationale: decrease edema and decrease pain to improve the patients ability to improve motion   Min Type Additional Details    [] Estim: []Att   []Unatt        []TENS instruct                  []IFC  []Premod   []NMES                     []Other:  []w/US   []w/ice   []w/heat  Position:  Location:    []  Traction: [] Cervical       []Lumbar                       [] Prone          []Supine                       []Intermittent   []Continuous Lbs:  [] before manual  [] after manual  []w/heat    []  Ultrasound: []Continuous   [] Pulsed at:                           []1MHz   []3MHz Location:  W/cm2:    [] Paraffin         Location:   []w/heat   10  10 [x]  Ice post    [x]  Heat pre w/ PREs  []  Ice massage Position:  Seated   Location: R shldr     []  Laser  []  Other: Position:  Location:      []  Vasopneumatic Device Pressure:       [] lo [] med [] hi   Temperature:      [x] Skin assessment post-treatment:  [x]intact []redness- no adverse reaction    []redness  adverse reaction:     45 min Therapeutic Exercise:  [x] See flow sheet : PREs w/ MH   Rationale: increase ROM to improve the patients ability to improve function      20 min Manual Therapy:  STM R UT, lev. Scapula, infraspinatus & subscap mm., PROM right shoulder flexion, ER, elbow ext  R scapula mobs into retraction/downward rotation in L s/l    Rationale: increase ROM and increase tissue extensibility to improve the patients ability to improve motion            With   [x] TE   [] TA   [] neuro   [] other: Patient Education: [x] Review HEP    [] Progressed/Changed HEP based on:   [] positioning   [] body mechanics   [] transfers   [x] heat/ice application    [x] other:      Other Objective/Functional Measures:    nt    Pain Level (0-10 scale) post treatment: 0/10     ASSESSMENT/Changes in Function:     Continues to require max verbal/tactile cues to avoid UT activation during AAROM exercises. PROM slowly improving. Patient will continue to benefit from skilled PT services to modify and progress therapeutic interventions, address functional mobility deficits, address ROM deficits, address strength deficits, analyze and address soft tissue restrictions, analyze and cue movement patterns and analyze and modify body mechanics/ergonomics to attain remaining goals.      [x]  See Plan of Care  []  See progress note/recertification  []  See Discharge Summary         Progress towards goals / Updated goals:  nt    PLAN  []  Upgrade activities as tolerated     [x]  Continue plan of care  []  Update interventions per flow sheet       []  Discharge due to:_  []  Other:      Michelle Turner PTA 6/21/2018  8:50 AM

## 2018-06-26 ENCOUNTER — HOSPITAL ENCOUNTER (OUTPATIENT)
Dept: PHYSICAL THERAPY | Age: 68
Discharge: HOME | End: 2018-06-26
Payer: MEDICARE

## 2018-06-26 PROCEDURE — 97110 THERAPEUTIC EXERCISES: CPT | Performed by: PHYSICAL THERAPY ASSISTANT

## 2018-06-26 PROCEDURE — 97140 MANUAL THERAPY 1/> REGIONS: CPT | Performed by: PHYSICAL THERAPY ASSISTANT

## 2018-06-26 NOTE — PROGRESS NOTES
PT DAILY TREATMENT NOTE - [facility] 2-15    Patient Name: Jayesh Rojo  Date:2018  : 1950  [x]  Patient  Verified  Payor: [place] MEDICARE / Plan: [place] MEDICARE PART A & B / Product Type: Medicare /    In time: 2:30 PM  Out time: 3:45 PM  Total Treatment Time (min): 75  Total Timed Codes (min): 55  1:1 Treatment Time (MC only): 54  Viisit #: 16    Treatment Area: Presence of unspecified artificial shoulder joint [Z96.619]  Primary osteoarthritis, unspecified shoulder [M19.019]    SUBJECTIVE  Pain Level (0-10 scale): 0/10  Any medication changes, allergies to medications, adverse drug reactions, diagnosis change, or new procedure performed?: [x] No    [] Yes (see summary sheet for update)  Subjective functional status/changes:   [] No changes reported  Patient reports \"not as much as I should\" in regards to HEP. States he may do the table walkouts and AAROM shoulder flexion using opposite arm in sitting but that is it. States he wants to ask his MD regarding the swelling in his R hand \"I wonder if I need an X-ray because I didn't have that swelling before the surgery. \" States he is feeling sore today as he went to [employer] and played 1 song.     OBJECTIVE    Modality rationale: decrease edema and decrease pain to improve the patients ability to improve motion   Min Type Additional Details    [] Estim: []Att   []Unatt        []TENS instruct                  []IFC  []Premod   []NMES                     []Other:  []w/US   []w/ice   []w/heat  Position:  Location:    []  Traction: [] Cervical       []Lumbar                       [] Prone          []Supine                       []Intermittent   []Continuous Lbs:  [] before manual  [] after manual  []w/heat    []  Ultrasound: []Continuous   [] Pulsed at:                           []1MHz   []3MHz Location:  W/cm2:    [] Paraffin         Location:   []w/heat   10  10 [x]  Ice post    [x]  Heat pre w/ PREs  []  Ice massage Position:  Seated   Location: R [facility] []  Laser  []  Other: Position:  Location:      []  Vasopneumatic Device Pressure:       [] lo [] med [] hi   Temperature:      [x] Skin assessment post-treatment:  [x]intact []redness- no adverse reaction    []redness  adverse reaction:     45 min Therapeutic Exercise:  [x] See flow sheet : PREs w/ MH   Rationale: increase ROM to improve the patients ability to improve function      10 min Manual Therapy:   PROM right shoulder flexion, ER, elbow ext  R scapula mobs into retraction/downward rotation in L s/l omit   Rationale: increase ROM and increase tissue extensibility to improve the patients ability to improve motion            With   [x] TE   [] TA   [] neuro   [] other: Patient Education: [x] Review HEP    [] Progressed/Changed HEP based on:   [] positioning   [] body mechanics   [] transfers   [x] heat/ice application    [x] other:      Other Objective/Functional Measures:    nt    Pain Level (0-10 scale) post treatment: 0/10     ASSESSMENT/Changes in Function:     Significant discussion on compliance with HEP to improve motion so that we can progress exercises in PT. Gave patient update HEP, bands and pulleys and strongly encouraged patient to perform HEP for maximum benefit from PT as patient's shoulder ROM continues to be stiff and limited. Patient will continue to benefit from skilled PT services to modify and progress therapeutic interventions, address functional mobility deficits, address ROM deficits, address strength deficits, analyze and address soft tissue restrictions, analyze and cue movement patterns and analyze and modify body mechanics/ergonomics to attain remaining goals.      [x]  See Plan of Care  []  See progress note/recertification  []  See Discharge Summary         Progress towards goals / Updated goals:  nt    PLAN  []  Upgrade activities as tolerated     [x]  Continue plan of care  []  Update interventions per flow sheet       []  Discharge due to:_  []  Other:      Mayco Cobb Ramin Colon, PTA 6/26/2018  2:30 PM

## 2018-06-29 ENCOUNTER — HOSPITAL ENCOUNTER (OUTPATIENT)
Dept: PHYSICAL THERAPY | Age: 68
Discharge: HOME | End: 2018-06-29
Payer: MEDICARE

## 2018-06-29 PROCEDURE — 97140 MANUAL THERAPY 1/> REGIONS: CPT

## 2018-06-29 PROCEDURE — 97110 THERAPEUTIC EXERCISES: CPT

## 2018-06-29 NOTE — PROGRESS NOTES
PT DAILY TREATMENT NOTE - [facility] 2-15    Patient Name: Donna Mcmahan  Date:2018  : 1950  [x]  Patient  Verified  Payor: [place] MEDICARE / Plan: [place] MEDICARE PART A & B / Product Type: Medicare /    In time: 1000 AM  Out time: 1110 AM  Total Treatment Time (min): 70  Total Timed Codes (min): 60  1:1 Treatment Time (MC only): 40  Viisit #: 17    Treatment Area: Presence of unspecified artificial shoulder joint [Z96.619]  Primary osteoarthritis, unspecified shoulder [M19.019]    SUBJECTIVE  Pain Level (0-10 scale): 9/10  Any medication changes, allergies to medications, adverse drug reactions, diagnosis change, or new procedure performed?: [x] No    [] Yes (see summary sheet for update)  Subjective functional status/changes:   [] No changes reported  Patient reports increased pain levels today, attributes to doing exercises last night w/ increased intensity, feels able to stretch harder using pulleys; reports practiced ukulele yesterday over an hour w/out signif aggravation of sx, working on using hand movement rather than larger strum    OBJECTIVE    Modality rationale: decrease edema and decrease pain to improve the patients ability to improve motion   Min Type Additional Details    [] Estim: []Att   []Unatt        []TENS instruct                  []IFC  []Premod   []NMES                     []Other:  []w/US   []w/ice   []w/heat  Position:  Location:    []  Traction: [] Cervical       []Lumbar                       [] Prone          []Supine                       []Intermittent   []Continuous Lbs:  [] before manual  [] after manual  []w/heat    []  Ultrasound: []Continuous   [] Pulsed at:                           []1MHz   []3MHz Location:  W/cm2:    [] Paraffin         Location:   []w/heat   10  10 [x]  Ice post    [x]  Heat pre w/ PREs  []  Ice massage Position:  Seated   Location: R shldr     []  Laser  []  Other: Position:  Location:      []  Vasopneumatic Device Pressure:       [] lo [] med [] hi Temperature:      [x] Skin assessment post-treatment:  [x]intact []redness- no adverse reaction    []redness  adverse reaction:     45 min Therapeutic Exercise:  [x] See flow sheet : PREs w/ MH   Rationale: increase ROM to improve the patients ability to improve function      15 min Manual Therapy:   PROM right shoulder flexion, ER, elbow ext  R scapula mobs into retraction/downward rotation in L s/l omit   Rationale: increase ROM and increase tissue extensibility to improve the patients ability to improve motion            With   [x] TE   [] TA   [] neuro   [] other: Patient Education: [x] Review HEP    [] Progressed/Changed HEP based on:   [] positioning   [] body mechanics   [] transfers   [x] heat/ice application    [x] other:      Other Objective/Functional Measures:    nt    Pain Level (0-10 scale) post treatment: 8/10     ASSESSMENT/Changes in Function:     Pt reports \"worked it really hard today\"     Patient will continue to benefit from skilled PT services to modify and progress therapeutic interventions, address functional mobility deficits, address ROM deficits, address strength deficits, analyze and address soft tissue restrictions, analyze and cue movement patterns and analyze and modify body mechanics/ergonomics to attain remaining goals.      [x]  See Plan of Care  []  See progress note/recertification  []  See Discharge Summary         Progress towards goals / Updated goals:  nt    PLAN  []  Upgrade activities as tolerated     [x]  Continue plan of care  []  Update interventions per flow sheet       []  Discharge due to:_  []  Other:      Jayden Lane, PT 6/29/2018  1010 AM

## 2018-07-03 ENCOUNTER — HOSPITAL ENCOUNTER (OUTPATIENT)
Dept: PHYSICAL THERAPY | Age: 68
Discharge: HOME | End: 2018-07-03
Payer: MEDICARE

## 2018-07-03 PROCEDURE — 97110 THERAPEUTIC EXERCISES: CPT | Performed by: PHYSICAL THERAPY ASSISTANT

## 2018-07-03 PROCEDURE — 97140 MANUAL THERAPY 1/> REGIONS: CPT | Performed by: PHYSICAL THERAPY ASSISTANT

## 2018-07-03 NOTE — PROGRESS NOTES
PT DAILY TREATMENT NOTE - [facility] 2-15    Patient Name: Omar Novak  Date:7/3/2018  : 1950  [x]  Patient  Verified  Payor: [place] MEDICARE / Plan: VA MEDICARE PART A & B / Product Type: Medicare /    In time: 5578 AM  Out time: 4809 AM  Total Treatment Time (min): 70  Total Timed Codes (min): 60  1:1 Treatment Time (MC only): 40  Viisit #: 18    Treatment Area: Presence of unspecified artificial shoulder joint [Z96.619]  Primary osteoarthritis, unspecified shoulder [M19.019]    SUBJECTIVE  Pain Level (0-10 scale): 5/10  Any medication changes, allergies to medications, adverse drug reactions, diagnosis change, or new procedure performed?: [x] No    [] Yes (see summary sheet for update)  Subjective functional status/changes:   [] No changes reported  Patient states he took extra pain medication last night due to difficulty sleeping at night. \"It hurts all day but it's worse at night. \" States \"every now and then I feel clunking in my shoulder like it did before the surgery. \" States he is going to call his MD regarding his pain levels and to also discuss his collar bone \"popping out. \"    OBJECTIVE    Modality rationale: decrease edema and decrease pain to improve the patients ability to improve motion   Min Type Additional Details    [] Estim: []Att   []Unatt        []TENS instruct                  []IFC  []Premod   []NMES                     []Other:  []w/US   []w/ice   []w/heat  Position:  Location:    []  Traction: [] Cervical       []Lumbar                       [] Prone          []Supine                       []Intermittent   []Continuous Lbs:  [] before manual  [] after manual  []w/heat    []  Ultrasound: []Continuous   [] Pulsed at:                           []1MHz   []3MHz Location:  W/cm2:    [] Paraffin         Location:   []w/heat   10  10 [x]  Ice post    [x]  Heat pre w/ PREs  []  Ice massage Position:  Seated   Location: R shldr     []  Laser  []  Other: Position:  Location:      []  Vasopneumatic Device Pressure:       [] lo [] med [] hi   Temperature:      [x] Skin assessment post-treatment:  [x]intact []redness- no adverse reaction    []redness  adverse reaction:     45 min Therapeutic Exercise:  [x] See flow sheet : PREs w/ MH   Rationale: increase ROM to improve the patients ability to improve function      15 min Manual Therapy:   PROM right shoulder flexion, ER, elbow ext  R scapula mobs into retraction/downward rotation in L s/l omit   Rationale: increase ROM and increase tissue extensibility to improve the patients ability to improve motion            With   [x] TE   [] TA   [] neuro   [] other: Patient Education: [x] Review HEP    [] Progressed/Changed HEP based on:   [] positioning   [] body mechanics   [] transfers   [x] heat/ice application    [x] other:      Other Objective/Functional Measures:    nt    Pain Level (0-10 scale) post treatment: 0/10     ASSESSMENT/Changes in Function:     Patient with improved mechanics during standing shoulder flexion using cane. Also improved compliance with HEP. Patient will continue to benefit from skilled PT services to modify and progress therapeutic interventions, address functional mobility deficits, address ROM deficits, address strength deficits, analyze and address soft tissue restrictions, analyze and cue movement patterns and analyze and modify body mechanics/ergonomics to attain remaining goals.      [x]  See Plan of Care  []  See progress note/recertification  []  See Discharge Summary         Progress towards goals / Updated goals:  nt    PLAN  []  Upgrade activities as tolerated     [x]  Continue plan of care  []  Update interventions per flow sheet       []  Discharge due to:_  []  Other:      Erwin Montes, PTA 7/3/2018  1035 AM

## 2018-07-06 ENCOUNTER — HOSPITAL ENCOUNTER (OUTPATIENT)
Dept: PHYSICAL THERAPY | Age: 68
Discharge: HOME | End: 2018-07-06
Payer: MEDICARE

## 2018-07-06 PROCEDURE — G8984 CARRY CURRENT STATUS: HCPCS

## 2018-07-06 PROCEDURE — 97110 THERAPEUTIC EXERCISES: CPT | Performed by: PHYSICAL THERAPY ASSISTANT

## 2018-07-06 PROCEDURE — 97140 MANUAL THERAPY 1/> REGIONS: CPT | Performed by: PHYSICAL THERAPY ASSISTANT

## 2018-07-06 PROCEDURE — G8985 CARRY GOAL STATUS: HCPCS

## 2018-07-06 NOTE — PROGRESS NOTES
PROGRESS NOTE - [facility] 2-15    Patient Name: Skylar Carpenter  Date:2018  : 1950  [x]  Patient  Verified  Payor: [place] MEDICARE / Plan: [place] MEDICARE PART A & B / Product Type: Medicare /    In time: 56 AM  Out time: 11:45 AM  Total Treatment Time (min): 75  Total Timed Codes (min): 55  1:1 Treatment Time (MC only): 39  Viisit #: 19    Treatment Area: Presence of unspecified artificial shoulder joint [Z96.619]  Primary osteoarthritis, unspecified shoulder [M19.019]    SUBJECTIVE  Pain Level (0-10 scale): 5/10  Any medication changes, allergies to medications, adverse drug reactions, diagnosis change, or new procedure performed?: [x] No    [] Yes (see summary sheet for update)  Subjective functional status/changes:   [] No changes reported  Patient states he called his MD regarding his continued high pain levels and wants his MD to [facility]. Something is going on there. \" States he is now able to wash his hair with both hands but has difficulty washing the back of his head.      OBJECTIVE    Modality rationale: decrease edema and decrease pain to improve the patients ability to improve motion   Min Type Additional Details    [] Estim: []Att   []Unatt        []TENS instruct                  []IFC  []Premod   []NMES                     []Other:  []w/US   []w/ice   []w/heat  Position:  Location:    []  Traction: [] Cervical       []Lumbar                       [] Prone          []Supine                       []Intermittent   []Continuous Lbs:  [] before manual  [] after manual  []w/heat    []  Ultrasound: []Continuous   [] Pulsed at:                           []1MHz   []3MHz Location:  W/cm2:    [] Paraffin         Location:   []w/heat   10  10 [x]  Ice post    [x]  Heat pre w/ PREs  []  Ice massage Position:  Seated   Location: R shldr     []  Laser  []  Other: Position:  Location:      []  Vasopneumatic Device Pressure:       [] lo [] med [] hi   Temperature:      [x] Skin assessment post-treatment:  [x]intact []redness- no adverse reaction    []redness  adverse reaction:     40 min Therapeutic Exercise:  [x] See flow sheet : PREs w/ MH    Modified session due to reassessment    Rationale: increase ROM to improve the patients ability to improve function      15 min Manual Therapy:   PROM right shoulder flexion, ER, elbow ext  R scapula mobs into retraction/downward rotation in L s/l omit   Rationale: increase ROM and increase tissue extensibility to improve the patients ability to improve motion            With   [x] TE   [] TA   [] neuro   [] other: Patient Education: [x] Review HEP    [] Progressed/Changed HEP based on:   [] positioning   [] body mechanics   [] transfers   [x] heat/ice application    [x] other:      Other Objective/Functional Measures:    ROM:  [] Unable to assess at this time                                             AROM                                                                PROM     Right Left    Right Left   Flexion 91 164 Flexion 108 158   Scaption/ABD 83 160 Scaption/ABD nt nt   ER @ 0 Degrees 18 50 ER @ 0 Degrees nt nt   ER @ 90 Degrees nt nt ER @ 45/90 Degrees 20 75   IR @ 90 Degrees nt nt IR @ 45/90 Degrees To abdomen  98   Functional ER R Ear/back of head T4            Functional IR nt T5            Elbow Flexion 145 pain wnl Elbow Flexion 145 pain wnl   Elbow Extension -10 pain wnl Elbow Extension -7 pain wnl       Strength:   [] Unable to assess at this time                                                                           MMT (0-5)      R (1-5) L (1-5)   Shoulder Flexion <3 pain 5   Shoulder Abduction <3 pain 5   Shoulder ER <3 pain 5   Shoulder IR <3 pain 5   Supraspinatus <3 pain 5   Elbow Flexion <3 pain 5   Elbow Extension <3 pain 5       Palpation:   [] Min  [x] Mod  [] Severe    Location: SC joint  [x] Min  [x] Mod  [] Severe    Location: R UT, infraspinatus, subscap, lev scapula mm., incr m.  Tone/banding        Outcome Measure: Patients Intake FS Score was 23 initially placing the patient in Stage 1. Patients FS score now is 55 out of 100 (32 points of functional change  since intake), placing the patient in Stage 3 and means patient has fair  shoulder function. Pain Level (0-10 scale) post treatment: 0/10     ASSESSMENT/Changes in Function:   Patient has been seen for 19 skilled PT visits, he has made slow gains with P/AROM (due to non compliance with HEP until recently) but continues to present with high pain levels. Patient with 32 point increase in FOTO outcome measure indicating  Improvements with functional tasks/ADL's and that patient has fair shoulder function. He continues to require frequent cues to avoid compensatory strategies during exercises. Patient with greatest complaint of collar bone pain (SC Joint) (sometimes greater than R shoulder pain) and noting episodes of popping at [place] joint. Patient will continue to benefit from skilled PT services to modify and progress therapeutic interventions, address functional mobility deficits, address ROM deficits, address strength deficits, analyze and address soft tissue restrictions, analyze and cue movement patterns and analyze and modify body mechanics/ergonomics to attain remaining goals. [x]  See Plan of Care  []  See progress note/recertification  []  See Discharge Summary         Progress towards goals / Updated goals:  Short Term Goals: To be accomplished in 2-3 weeks:  1) Patient will be independent with HEP Met  2) Patient will increase R shoulder flex PROM to >/=115 degrees to work toward normalization of motion Not Met  3) Patient will report >/= 25% decrease in pain Met  4) Patient will increase R elbow AROM ext to neutral for normalization of motion  Not met  5) Patient will demonstrate understanding/application of ice utilization recommendations Partially met      Long Term Goals:  To be accomplished in 8-10 weeks: Progressing towards LTG's  1) Patient will report >/= 75% decrease in pain  2) Patient will demonstrate independent with modified home/gym program without aggravation of shoulder pain  3) Patient will increase R shoulder flex AROM to >/= 150 deg so that can reach overhead items in closet  4) Patient will increase R shoulder IR behind back AROM to >/= L1 so that can tuck in shirt   5) Patient will increase R shldr ER functional AROM to >/= T3 so that can wash back     PLAN  []  Upgrade activities as tolerated     [x]  Continue plan of care  []  Update interventions per flow sheet       []  Discharge due to:_  []  Other:      Judie Chirinos, PTA 7/6/2018  1035 AM

## 2018-07-06 NOTE — PROGRESS NOTES
MD NOTE - [facility] 2-15    Patient Name: Kayden Sarmiento  Date:2018  : 1950  [x]  Patient  Verified  Payor: [place] MEDICARE / Plan: [place] MEDICARE PART A & B / Product Type: Medicare /        Treatment Area: Presence of unspecified artificial shoulder joint [Z96.619]  Primary osteoarthritis, unspecified shoulder [M19.019]    SUBJECTIVE  Pain Level (0-10 scale): 5/10  Any medication changes, allergies to medications, adverse drug reactions, diagnosis change, or new procedure performed?: [x] No    [] Yes (see summary sheet for update)  Subjective functional status/changes:   [] No changes reported  Patient states he called his MD regarding his continued high pain levels and wants his MD to [facility]. Something is going on there. \" States he is now able to wash his hair with both hands but has difficulty washing the back of his head.      OBJECTIVE     ROM:  [] Unable to assess at this time  [facility], [facility]     Right Left    Right Left   Flexion 91 164 Flexion 108 158   Scaption/ABD 83 160 Scaption/ABD nt nt   ER @ 0 Degrees 18 50 ER @ 0 Degrees nt nt   ER @ 90 Degrees nt nt ER @ 45/90 Degrees 20 75   IR @ 90 Degrees nt nt IR @ 45/90 Degrees To abdomen  98   Functional ER R Ear/back of head T4            Functional IR nt T5            Elbow Flexion 145 pain wnl Elbow Flexion 145 pain wnl   Elbow Extension -10 pain wnl Elbow Extension -7 pain wnl       Strength:   [] Unable to assess at this time                                                                           MMT (0-5)      R (1-5) L (1-5)   Shoulder Flexion <3 pain 5   Shoulder Abduction <3 pain 5   Shoulder ER <3 pain 5   Shoulder IR <3 pain 5   Supraspinatus <3 pain 5   Elbow Flexion <3 pain 5   Elbow Extension <3 pain 5       Palpation:   [] Min  [x] Mod  [] Severe    Location: SC joint  [x] Min  [x] Mod  [] Severe    Location: R UT, infraspinatus, subscap, lev scapula mm., incr m. Tone/banding        Outcome Measure:   Patients Intake FS Score was 23 initially placing the patient in Stage 1. Patients FS score now is 55 out of 100 (32 points of functional change  since intake), placing the patient in Stage 3 and means patient has fair  shoulder function. Pain Level (0-10 scale) post treatment: 0/10     ASSESSMENT/Changes in Function:   Patient has been seen for 19 skilled PT visits, he has made slow gains with P/AROM (due to non compliance with HEP until recently) but continues to present with high pain levels. Patient with 32 point increase in FOTO outcome measure indicating  Improvements with functional tasks/ADL's and that patient has fair shoulder function. He continues to require frequent cues to avoid compensatory strategies during exercises. Patient with greatest complaint of collar bone pain (SC Joint) (sometimes greater than R shoulder pain) and noting episodes of popping at [place] joint. Patient will continue to benefit from skilled PT services to modify and progress therapeutic interventions, address functional mobility deficits, address ROM deficits, address strength deficits, analyze and address soft tissue restrictions, analyze and cue movement patterns and analyze and modify body mechanics/ergonomics to attain remaining goals. [x]  See Plan of Care  []  See progress note/recertification  []  See Discharge Summary         Progress towards goals / Updated goals:  Short Term Goals:  To be accomplished in 2-3 weeks:  1) Patient will be independent with HEP Met  2) Patient will increase R shoulder flex PROM to >/=115 degrees to work toward normalization of motion Not Met  3) Patient will report >/= 25% decrease in pain Met  4) Patient will increase R elbow AROM ext to neutral for normalization of motion  Not met  5) Patient will demonstrate understanding/application of ice utilization recommendations Partially met      Long Term Goals:  To be accomplished in 8-10 weeks: Progressing towards LTG's  1) Patient will report >/= 75% decrease in pain  2) Patient will demonstrate independent with modified home/gym program without aggravation of shoulder pain  3) Patient will increase R shoulder flex AROM to >/= 150 deg so that can reach overhead items in closet  4) Patient will increase R shoulder IR behind back AROM to >/= L1 so that can tuck in shirt   5) Patient will increase R shldr ER functional AROM to >/= T3 so that can wash back     PLAN  []  Upgrade activities as tolerated     [x]  Continue plan of care  []  Update interventions per flow sheet       []  Discharge due to:_  []  Other:      Kristi Crespo PTA 7/6/2018  1035 AM

## 2018-07-10 ENCOUNTER — HOSPITAL ENCOUNTER (OUTPATIENT)
Dept: PHYSICAL THERAPY | Age: 68
Discharge: HOME | End: 2018-07-10
Payer: MEDICARE

## 2018-07-10 PROCEDURE — 97110 THERAPEUTIC EXERCISES: CPT

## 2018-07-10 PROCEDURE — 97140 MANUAL THERAPY 1/> REGIONS: CPT

## 2018-07-10 NOTE — PROGRESS NOTES
PT DAILY TREATMENT NOTE - [facility] 2-15    Patient Name: Chino Brand  Date:7/10/2018  : 1950  [x]  Patient  Verified  Payor: [place] MEDICARE / Plan: VA MEDICARE PART A & B / Product Type: Medicare /    In time: 1000 AM  Out time: 1120 AM  Total Treatment Time (min): 80  Total Timed Codes (min): 60  1:1 Treatment Time (MC only): 40  Viisit #: 20    Treatment Area: Presence of unspecified artificial shoulder joint [Z96.619]  Primary osteoarthritis, unspecified shoulder [M19.019]    SUBJECTIVE  Pain Level (0-10 scale): 8/10  Any medication changes, allergies to medications, adverse drug reactions, diagnosis change, or new procedure performed?: [x] No    [] Yes (see summary sheet for update)  Subjective functional status/changes:   [] No changes reported    Patient reports cont high pain levels, is scheduled to see MD this afternoon to discuss pain levels and concerns re: collar bone     OBJECTIVE    Modality rationale: decrease edema and decrease pain to improve the patients ability to improve motion   Min Type Additional Details    [] Estim: []Att   []Unatt        []TENS instruct                  []IFC  []Premod   []NMES                     []Other:  []w/US   []w/ice   []w/heat  Position:  Location:    []  Traction: [] Cervical       []Lumbar                       [] Prone          []Supine                       []Intermittent   []Continuous Lbs:  [] before manual  [] after manual  []w/heat    []  Ultrasound: []Continuous   [] Pulsed at:                           []1MHz   []3MHz Location:  W/cm2:    [] Paraffin         Location:   []w/heat   10  10 [x]  Ice post    [x]  Heat pre w/ PREs  []  Ice massage Position:  Seated   Location: R shldr     []  Laser  []  Other: Position:  Location:      []  Vasopneumatic Device Pressure:       [] lo [] med [] hi   Temperature:      [x] Skin assessment post-treatment:  [x]intact []redness- no adverse reaction    []redness  adverse reaction:     45 min Therapeutic Exercise: [x] See flow sheet : PREs w/ MH   Rationale: increase ROM to improve the patients ability to improve function      15 min Manual Therapy:   PROM right shoulder flexion, ER, elbow ext  R scapula mobs into retraction/downward rotation in L s/l omit   Rationale: increase ROM and increase tissue extensibility to improve the patients ability to improve motion            With   [x] TE   [] TA   [] neuro   [] other: Patient Education: [x] Review HEP    [] Progressed/Changed HEP based on:   [] positioning   [] body mechanics   [] transfers   [x] heat/ice application    [x] other:      Other Objective/Functional Measures:    nt    Pain Level (0-10 scale) post treatment: 8/10     ASSESSMENT/Changes in Function:     Discussed w/ pt role of SC joint and clavicle in regards to shoulder motion and that the occurrence of compensatory movement is not abnormal however feel that patient will benefit from discussing this and cont high pain levels w/ MD     Patient will continue to benefit from skilled PT services to modify and progress therapeutic interventions, address functional mobility deficits, address ROM deficits, address strength deficits, analyze and address soft tissue restrictions, analyze and cue movement patterns and analyze and modify body mechanics/ergonomics to attain remaining goals.      [x]  See Plan of Care  []  See progress note/recertification  []  See Discharge Summary         Progress towards goals / Updated goals:  nt    PLAN  []  Upgrade activities as tolerated     [x]  Continue plan of care  []  Update interventions per flow sheet       []  Discharge due to:_  [x]  Other: cont as directed by MD after f/u this afternoon       Alan Thompson, PT 7/10/2018  1001 AM

## 2018-07-13 ENCOUNTER — HOSPITAL ENCOUNTER (OUTPATIENT)
Dept: PHYSICAL THERAPY | Age: 68
Discharge: HOME | End: 2018-07-13
Payer: MEDICARE

## 2018-07-13 PROCEDURE — 97140 MANUAL THERAPY 1/> REGIONS: CPT | Performed by: PHYSICAL THERAPY ASSISTANT

## 2018-07-13 PROCEDURE — 97110 THERAPEUTIC EXERCISES: CPT | Performed by: PHYSICAL THERAPY ASSISTANT

## 2018-07-13 NOTE — PROGRESS NOTES
PT DAILY TREATMENT NOTE - [facility] 2-15    Patient Name: Lemuel Garza  Date:2018  : 1950  [x]  Patient  Verified  Payor: [place] MEDICARE / Plan: VA MEDICARE PART A & B / Product Type: Medicare /    In time: 9:50 AM  Out time: 1120 AM  Total Treatment Time (min): 85  Total Timed Codes (min): 65  1:1 Treatment Time (MC only): 40  Viisit #: 21    Treatment Area: Presence of unspecified artificial shoulder joint [Z96.619]  Primary osteoarthritis, unspecified shoulder [M19.019]    SUBJECTIVE   Pain Level (0-10 scale): 810  Any medication changes, allergies to medications, adverse drug reactions, diagnosis change, or new procedure performed?: [x] No    [] Yes (see summary sheet for update)  Subjective functional status/changes:   [] No changes reported    Patient states he went to f/u with Dr. Riley Gupta earlier this week, states \"there may be some nerve damage and they tried me on 3 medications and I wound up having a stomach bleed. \" States he was not impressed with his appointment and that they did not address his collar bone. X-rays were completed and were normal per patient report.      OBJECTIVE    Modality rationale: decrease edema and decrease pain to improve the patients ability to improve motion   Min Type Additional Details    [] Estim: []Att   []Unatt        []TENS instruct                  []IFC  []Premod   []NMES                     []Other:  []w/US   []w/ice   []w/heat  Position:  Location:    []  Traction: [] Cervical       []Lumbar                       [] Prone          []Supine                       []Intermittent   []Continuous Lbs:  [] before manual  [] after manual  []w/heat    []  Ultrasound: []Continuous   [] Pulsed at:                           []1MHz   []3MHz Location:  W/cm2:    [] Paraffin         Location:   []w/heat   10  10 [x]  Ice post    [x]  Heat pre w/ PREs  []  Ice massage Position:  Seated   Location: R shldr     []  Laser  []  Other: Position:  Location:      []  Vasopneumatic Device Pressure:       [] lo [] med [] hi   Temperature:      [x] Skin assessment post-treatment:  [x]intact []redness- no adverse reaction    []redness  adverse reaction:     55 min Therapeutic Exercise:  [x] See flow sheet : PREs w/ MH  Added bent over shoulder row using 2 pound weight, bent over shoulder extension using 1 pound weight, triceps extension using red band   Rationale: increase ROM to improve the patients ability to improve function      10 min Manual Therapy:   PROM right shoulder flexion, ER, elbow ext     Rationale: increase ROM and increase tissue extensibility to improve the patients ability to improve motion            With   [x] TE   [] TA   [] neuro   [] other: Patient Education: [x] Review HEP    [] Progressed/Changed HEP based on:   [] positioning   [] body mechanics   [] transfers   [x] heat/ice application    [x] other:      Other Objective/Functional Measures:    nt    Pain Level (0-10 scale) post treatment: \"numb\"    ASSESSMENT/Changes in Function:   Patient continues to present with high pain levels. Added in bent over parascapular strengthening today which patient required frequent cues to avoid UT activation. Fatigue noted at end of session. Patient will continue to benefit from skilled PT services to modify and progress therapeutic interventions, address functional mobility deficits, address ROM deficits, address strength deficits, analyze and address soft tissue restrictions, analyze and cue movement patterns and analyze and modify body mechanics/ergonomics to attain remaining goals. [x]  See Plan of Care  []  See progress note/recertification  []  See Discharge Summary         Progress towards goals / Updated goals:  nt    PLAN  []  Upgrade activities as tolerated     [x]  Continue plan of care  []  Update interventions per flow sheet       []  Discharge due to:_  [x]  Other: called MD's office to clarify recent appointment and to discuss POC.     Kenny Jarvis PTA 7/13/2018  9:50 AM

## 2018-07-17 ENCOUNTER — HOSPITAL ENCOUNTER (OUTPATIENT)
Dept: PHYSICAL THERAPY | Age: 68
Discharge: HOME | End: 2018-07-17
Payer: MEDICARE

## 2018-07-17 PROCEDURE — 97140 MANUAL THERAPY 1/> REGIONS: CPT | Performed by: PHYSICAL THERAPY ASSISTANT

## 2018-07-17 PROCEDURE — 97110 THERAPEUTIC EXERCISES: CPT | Performed by: PHYSICAL THERAPY ASSISTANT

## 2018-07-17 NOTE — PROGRESS NOTES
PT DAILY TREATMENT NOTE - [facility] 2-15    Patient Name: Jenny Dupont  Date:2018  : 1950  [x]  Patient  Verified  Payor: [place] MEDICARE / Plan: VA MEDICARE PART A & B / Product Type: Medicare /    In time: 1:30 PM  Out time: 2:50 PM  Total Treatment Time (min): 80  Total Timed Codes (min): 60  1:1 Treatment Time (MC only): 40  Viisit #: 22    Treatment Area: Presence of unspecified artificial shoulder joint [Z96.619]  Primary osteoarthritis, unspecified shoulder [M19.019]    SUBJECTIVE   Pain Level (0-10 scale): 8/10  Any medication changes, allergies to medications, adverse drug reactions, diagnosis change, or new procedure performed?: [x] No    [] Yes (see summary sheet for update)  Subjective functional status/changes:   [] No changes reported    Patient reports he went to Dr. Giovanny Pemberton office on Friday, he had an Xray of his R shoulder and clavicle which were all normal. States he is scheduled for an EMG on 18 to rule of CRPS due to continued high pain levels and decreased motion.  Patient reporting he is not happy with his care and is planning on finding a new MD.     OBJECTIVE    Modality rationale: decrease edema and decrease pain to improve the patients ability to improve motion   Min Type Additional Details    [] Estim: []Att   []Unatt        []TENS instruct                  []IFC  []Premod   []NMES                     []Other:  []w/US   []w/ice   []w/heat  Position:  Location:    []  Traction: [] Cervical       []Lumbar                       [] Prone          []Supine                       []Intermittent   []Continuous Lbs:  [] before manual  [] after manual  []w/heat    []  Ultrasound: []Continuous   [] Pulsed at:                           []1MHz   []3MHz Location:  W/cm2:    [] Paraffin         Location:   []w/heat   10  10 [x]  Ice post    [x]  Heat pre w/ PREs  []  Ice massage Position:  Seated   Location: R shldr     []  Laser  []  Other: Position:  Location:      []  Vasopneumatic Device Pressure:       [] lo [] med [] hi   Temperature:      [x] Skin assessment post-treatment:  [x]intact []redness- no adverse reaction    []redness  adverse reaction:     50 min Therapeutic Exercise:  [x] See flow sheet : PREs w/ MH  Added AAROM shoulder flexion/scaption at railing   Rationale: increase ROM to improve the patients ability to improve function      10 min Manual Therapy:   PROM right shoulder flexion, ER, elbow ext     Rationale: increase ROM and increase tissue extensibility to improve the patients ability to improve motion            With   [x] TE   [] TA   [] neuro   [] other: Patient Education: [x] Review HEP    [] Progressed/Changed HEP based on:   [] positioning   [] body mechanics   [] transfers   [x] heat/ice application    [x] other:      Other Objective/Functional Measures:    nt    Pain Level (0-10 scale) post treatment: 0/10    ASSESSMENT/Changes in Function:   Patient continues to have increased R shoulder tightness during PROM, some improvements after stretching and manual therapy but overall slow progress with therapeutic interventions. Patient will continue to benefit from skilled PT services to modify and progress therapeutic interventions, address functional mobility deficits, address ROM deficits, address strength deficits, analyze and address soft tissue restrictions, analyze and cue movement patterns and analyze and modify body mechanics/ergonomics to attain remaining goals.      [x]  See Plan of Care  []  See progress note/recertification  []  See Discharge Summary         Progress towards goals / Updated goals:  nt    PLAN  []  Upgrade activities as tolerated     [x]  Continue plan of care  []  Update interventions per flow sheet       []  Discharge due to:_  []  Other:     Mireya Lyon, RC 7/17/2018  1:30 PM

## 2018-07-20 ENCOUNTER — HOSPITAL ENCOUNTER (OUTPATIENT)
Dept: PHYSICAL THERAPY | Age: 68
Discharge: HOME | End: 2018-07-20
Payer: MEDICARE

## 2018-07-20 PROCEDURE — 97110 THERAPEUTIC EXERCISES: CPT

## 2018-07-20 PROCEDURE — 97140 MANUAL THERAPY 1/> REGIONS: CPT

## 2018-07-24 ENCOUNTER — HOSPITAL ENCOUNTER (OUTPATIENT)
Dept: PHYSICAL THERAPY | Age: 68
Discharge: HOME | End: 2018-07-24
Payer: MEDICARE

## 2018-07-24 PROCEDURE — 97110 THERAPEUTIC EXERCISES: CPT | Performed by: PHYSICAL THERAPY ASSISTANT

## 2018-07-24 PROCEDURE — 97140 MANUAL THERAPY 1/> REGIONS: CPT | Performed by: PHYSICAL THERAPY ASSISTANT

## 2018-07-24 NOTE — PROGRESS NOTES
PT DAILY TREATMENT NOTE - [facility] 2-15    Patient Name: Ekaterina Malone  Date:2018  : 1950  [x]  Patient  Verified  Payor: Bret Murrell / Plan: [place] MEDICARE PART A & B / Product Type: Medicare /    In time: 1:35 PM  Out time: 2:50 PM  Total Treatment Time (min): 85  Total Timed Codes (min): 65  1:1 Treatment Time ([address] only): 40  Viisit #: 24  Pain Management: 18  Neuro for EM18    Treatment Area: Pain in right shoulder [M25.511]    SUBJECTIVE   Pain Level (0-10 scale): 510  Any medication changes, allergies to medications, adverse drug reactions, diagnosis change, or new procedure performed?: [x] No    [] Yes (see summary sheet for update)  Subjective functional status/changes:   [] No changes reported    Patient reports he has a pain management appt on 18. Continues to report high pain levels after exercises.      OBJECTIVE    Modality rationale: decrease edema and decrease pain to improve the patients ability to improve motion   Min Type Additional Details    [] Estim: []Att   []Unatt        []TENS instruct                  []IFC  []Premod   []NMES                     []Other:  []w/US   []w/ice   []w/heat  Position:  Location:    []  Traction: [] Cervical       []Lumbar                       [] Prone          []Supine                       []Intermittent   []Continuous Lbs:  [] before manual  [] after manual  []w/heat    []  Ultrasound: []Continuous   [] Pulsed at:                           []1MHz   []3MHz Location:  W/cm2:    [] Paraffin         Location:   []w/heat   10  10 [x]  Ice post    [x]  Heat pre w/ PREs  []  Ice massage Position:  Seated   Location: R shldr     []  Laser  []  Other: Position:  Location:      []  Vasopneumatic Device Pressure:       [] lo [] med [] hi   Temperature:      [x] Skin assessment post-treatment:  [x]intact []redness- no adverse reaction    []redness  adverse reaction:     50 min Therapeutic Exercise:  [x] See flow sheet : PREs w/ MH  Progressed weights/reps per flow sheet   Rationale: increase ROM to improve the patients ability to improve function      15 min Manual Therapy:   PROM right shoulder flexion, ER, elbow ext     Rationale: increase ROM and increase tissue extensibility to improve the patients ability to improve motion            With   [x] TE   [] TA   [] neuro   [] other: Patient Education: [x] Review HEP    [] Progressed/Changed HEP based on:   [] positioning   [] body mechanics   [] transfers   [x] heat/ice application    [] other:      Other Objective/Functional Measures:    nt    Pain Level (0-10 scale) post treatment: 0/10    ASSESSMENT/Changes in Function:     Challenged with s/l shoulder ABD, cues to stabilize scapula prior to abduction R shoulder. Patient will continue to benefit from skilled PT services to modify and progress therapeutic interventions, address functional mobility deficits, address ROM deficits, address strength deficits, analyze and address soft tissue restrictions, analyze and cue movement patterns and analyze and modify body mechanics/ergonomics to attain remaining goals.      [x]  See Plan of Care  []  See progress note/recertification  []  See Discharge Summary         Progress towards goals / Updated goals:  nt    PLAN  []  Upgrade activities as tolerated     [x]  Continue plan of care  []  Update interventions per flow sheet       []  Discharge due to:_  []  Other:     Ayaz Montes PTA 7/24/2018  1:35 PM

## 2018-07-27 ENCOUNTER — HOSPITAL ENCOUNTER (OUTPATIENT)
Dept: PHYSICAL THERAPY | Age: 68
Discharge: HOME | End: 2018-07-27
Payer: MEDICARE

## 2018-07-27 PROCEDURE — 97140 MANUAL THERAPY 1/> REGIONS: CPT

## 2018-07-27 PROCEDURE — 97110 THERAPEUTIC EXERCISES: CPT

## 2018-07-27 NOTE — PROGRESS NOTES
PT DAILY TREATMENT NOTE - [facility] 2-15 Patient Name: Rubi Johnson Date:2018 : 1950 [x]  Patient  Verified Payor: [place] MEDICARE / Plan: [facility] / Product Type: Medicare / In time: 925 AM  Out time: 1030 AM 
Total Treatment Time (min): 65 Total Timed Codes (min): 55 
1:1 Treatment Time (MC only): 40 Viisit #: 25 
Pain Management: 18 Neuro for EM18 Treatment Area: Pain in right shoulder [M25.511] SUBJECTIVE Pain Level (0-10 scale): 7/10 Any medication changes, allergies to medications, adverse drug reactions, diagnosis change, or new procedure performed?: [x] No    [] Yes (see summary sheet for update) Subjective functional status/changes:   [] No changes reported Patient reports continued high pain levels, \"maybe I could do more if they would give me something to help the pain\"; states he is frustrated that pain management MD can not see him for 2 weeks \"going to call them and tell them it won't matter by then\" OBJECTIVE Modality rationale: decrease edema and decrease pain to improve the patients ability to improve motion Min Type Additional Details  
 [] Estim: []Att   []Unatt        []TENS instruct []IFC  []Premod   []NMES []Other:  []w/US   []w/ice   []w/heat Position: Location:  
 []  Traction: [] Cervical       []Lumbar 
                     [] Prone          []Supine []Intermittent   []Continuous Lbs: 
[] before manual 
[] after manual 
[]w/heat  
 []  Ultrasound: []Continuous   [] Pulsed at: 
                         []1MHz   []3MHz Location: 
W/cm2:  
 [] Paraffin Location:  
[]w/heat  
10 To go [x]  Ice post    [x]  Heat pre w/ PREs []  Ice massage Position:  Seated Location: R shldr   
 []  Laser 
[]  Other: Position: Location:  
 
 []  Vasopneumatic Device Pressure:       [] lo [] med [] hi  
Temperature:   
 
[x] Skin assessment post-treatment:  [x]intact []redness- no adverse reaction 
  []redness  adverse reaction:  
 
40 min Therapeutic Exercise:  [x] See flow sheet : PREs w/ MH Progressed weights/reps per flow sheet Rationale: increase ROM to improve the patients ability to improve function 15 min Manual Therapy:   PROM right shoulder flexion, ER, elbow ext Rationale: increase ROM and increase tissue extensibility to improve the patients ability to improve motion With 
 [x] TE 
 [] TA 
 [] neuro 
 [] other: Patient Education: [x] Review HEP [] Progressed/Changed HEP based on:  
[] positioning   [] body mechanics   [] transfers   [x] heat/ice application   
[] other:   
 
Other Objective/Functional Measures:   
nt 
 
Pain Level (0-10 scale) post treatment: 10/10 ASSESSMENT/Changes in Function: Pt cont to c/o high pain levels throughout treatment and is very painful w/ end range PROM; pt vocalizing frustration w/ lack of progress and delay with appt w/ pain management; advised pt to contact pain management office and discuss cancellation/waiting list and encouraged continued use of ice and heat to manage sx Patient will continue to benefit from skilled PT services to modify and progress therapeutic interventions, address functional mobility deficits, address ROM deficits, address strength deficits, analyze and address soft tissue restrictions, analyze and cue movement patterns and analyze and modify body mechanics/ergonomics to attain remaining goals. [x]  See Plan of Care 
[]  See progress note/recertification 
[]  See Discharge Summary Progress towards goals / Updated goals: 
nt 
 
PLAN 
[]  Upgrade activities as tolerated     [x]  Continue plan of care 
[]  Update interventions per flow sheet      
[]  Discharge due to:_ 
[]  Other:  
 
Rosa Blancas, PT 7/27/2018  940 AM

## 2018-07-31 ENCOUNTER — HOSPITAL ENCOUNTER (OUTPATIENT)
Dept: PHYSICAL THERAPY | Age: 68
Discharge: HOME | End: 2018-07-31
Payer: MEDICARE

## 2018-07-31 PROCEDURE — 97110 THERAPEUTIC EXERCISES: CPT | Performed by: PHYSICAL THERAPY ASSISTANT

## 2018-07-31 PROCEDURE — 97140 MANUAL THERAPY 1/> REGIONS: CPT | Performed by: PHYSICAL THERAPY ASSISTANT

## 2018-07-31 NOTE — PROGRESS NOTES
PT DAILY TREATMENT NOTE - [facility] 2-15 Patient Name: Chino Brand Date:2018 : 1950 [x]  Patient  Verified Payor: [place] MEDICARE / Plan: Melissa Chaudhari / Product Type: Medicare / In time: 10:30 AM  Out time: 11:35 AM 
Total Treatment Time (min): 65 Total Timed Codes (min): 55 
1:1 Treatment Time (MC only): 40 Viisit #: 26 Pain Management: 18 Neuro for EM18 Treatment Area: Pain in right shoulder [M25.511] SUBJECTIVE Pain Level (0-10 scale): 6/10 Any medication changes, allergies to medications, adverse drug reactions, diagnosis change, or new procedure performed?: [x] No    [] Yes (see summary sheet for update) Subjective functional status/changes:   [] No changes reported Patient reports \"It's the same pain, no change there. I took 2 Advil last night to help me sleep. I still woke up at 6 this morning and am still tired. \" OBJECTIVE Modality rationale: decrease edema and decrease pain to improve the patients ability to improve motion Min Type Additional Details  
 [] Estim: []Att   []Unatt        []TENS instruct []IFC  []Premod   []NMES []Other:  []w/US   []w/ice   []w/heat Position: Location:  
 []  Traction: [] Cervical       []Lumbar 
                     [] Prone          []Supine []Intermittent   []Continuous Lbs: 
[] before manual 
[] after manual 
[]w/heat  
 []  Ultrasound: []Continuous   [] Pulsed at: 
                         []1MHz   []3MHz Location: 
W/cm2:  
 [] Paraffin Location:  
[]w/heat  
10 [x]  Ice post    []  Heat pre w/ PREs []  Ice massage Position:  Seated Location: R shldr   
 []  Laser 
[]  Other: Position: Location:  
 
 []  Vasopneumatic Device Pressure:       [] lo [] med [] hi  
Temperature:   
 
[x] Skin assessment post-treatment:  [x]intact []redness- no adverse reaction 
  []redness  adverse reaction:  
 
40 min Therapeutic Exercise:  [x] See flow sheet : PREs w/ MH omit Added TBall shoulder flexion at wall using red theraball, wall push ups Rationale: increase ROM to improve the patients ability to improve function 15 min Manual Therapy:   PROM right shoulder flexion, scaption ER, elbow ext Rationale: increase ROM and increase tissue extensibility to improve the patients ability to improve motion With 
 [x] TE 
 [] TA 
 [] neuro 
 [] other: Patient Education: [x] Review HEP [] Progressed/Changed HEP based on:  
[] positioning   [] body mechanics   [] transfers   [x] heat/ice application   
[] other:   
 
Other Objective/Functional Measures:   
nt 
 
Pain Level (0-10 scale) post treatment: 10/10 prior to icing, \"better\" after icing ASSESSMENT/Changes in Function:  
Patient with difficulty extending R elbow in weightbearing positions and noted significant fatigue after wall pushups. Tolerated TB IR using red band well and noting improved strength with shoulder ER. PROM continues to be limited by pain and tightness. Patient will continue to benefit from skilled PT services to modify and progress therapeutic interventions, address functional mobility deficits, address ROM deficits, address strength deficits, analyze and address soft tissue restrictions, analyze and cue movement patterns and analyze and modify body mechanics/ergonomics to attain remaining goals. [x]  See Plan of Care 
[]  See progress note/recertification 
[]  See Discharge Summary Progress towards goals / Updated goals: 
nt 
 
PLAN 
[]  Upgrade activities as tolerated     [x]  Continue plan of care 
[]  Update interventions per flow sheet      
[]  Discharge due to:_ 
[]  Other:  
 
Nico Yates PTA 7/31/2018  10:30 AM

## 2018-08-03 ENCOUNTER — HOSPITAL ENCOUNTER (OUTPATIENT)
Dept: PHYSICAL THERAPY | Age: 68
Discharge: HOME | End: 2018-08-03
Payer: MEDICARE

## 2018-08-03 PROCEDURE — 97110 THERAPEUTIC EXERCISES: CPT

## 2018-08-03 PROCEDURE — 97140 MANUAL THERAPY 1/> REGIONS: CPT

## 2018-08-03 NOTE — PROGRESS NOTES
PT DAILY TREATMENT NOTE - [facility] -15 Patient Name: Ruddy Cheatham Date:8/3/2018 : 1950 [x]  Patient  Verified Payor: [place] MEDICARE / Plan: Melissa Shaffer [facility] / Product Type: Medicare / In time: 10:30 AM  Out time: 1140 AM 
Total Treatment Time (min): 70 Total Timed Codes (min): 60 
1:1 Treatment Time (MC only): 55 Viisit #: 27 
Pain Management: 18 Neuro for EM18 Treatment Area: Pain in right shoulder [M25.511] SUBJECTIVE Pain Level (0-10 scale): 8/10 Any medication changes, allergies to medications, adverse drug reactions, diagnosis change, or new procedure performed?: [x] No    [] Yes (see summary sheet for update) Subjective functional status/changes:   [] No changes reported Patient reports saw pain management MD yesterday, given prescription for pain patches and Lyrica, has not yet filled prescriptions; states \"if this medicine doesn't work then I'm going to take matters in my own hands\"; reports that he has tried pain patches before and they did not help OBJECTIVE Modality rationale: decrease edema and decrease pain to improve the patients ability to improve motion Min Type Additional Details  
 [] Estim: []Att   []Unatt        []TENS instruct []IFC  []Premod   []NMES []Other:  []w/US   []w/ice   []w/heat Position: Location:  
 []  Traction: [] Cervical       []Lumbar 
                     [] Prone          []Supine []Intermittent   []Continuous Lbs: 
[] before manual 
[] after manual 
[]w/heat  
 []  Ultrasound: []Continuous   [] Pulsed at: 
                         []1MHz   []3MHz Location: 
W/cm2:  
 [] Paraffin Location:  
[]w/heat  
10 [x]  Ice post    []  Heat pre w/ PREs []  Ice massage Position:  Seated Location: R shldr   
 []  Laser 
[]  Other: Position: Location:  
 
 []  Vasopneumatic Device Pressure:       [] lo [] med [] hi  
Temperature:   
 
[x] Skin assessment post-treatment:  [x]intact []redness- no adverse reaction 
  []redness  adverse reaction:  
 
45 min Therapeutic Exercise:  [x] See flow sheet : incr resistance/reps as per chart Rationale: increase ROM to improve the patients ability to improve function 15 min Manual Therapy:   PROM right shoulder flexion, scaption ER, elbow ext Rationale: increase ROM and increase tissue extensibility to improve the patients ability to improve motion With 
 [x] TE 
 [] TA 
 [] neuro 
 [] other: Patient Education: [x] Review HEP [] Progressed/Changed HEP based on:  
[] positioning   [] body mechanics   [] transfers   [x] heat/ice application   
[] other:   
 
Other Objective/Functional Measures:   
nt 
 
Pain Level (0-10 scale) post treatment: 8-9/10 ASSESSMENT/Changes in Function: Pt cont to voice frustration re: cont high pain levels and MD resistance to prescribing pain medication; ROM limited by pain and joint stiffness Patient will continue to benefit from skilled PT services to modify and progress therapeutic interventions, address functional mobility deficits, address ROM deficits, address strength deficits, analyze and address soft tissue restrictions, analyze and cue movement patterns and analyze and modify body mechanics/ergonomics to attain remaining goals. [x]  See Plan of Care 
[]  See progress note/recertification 
[]  See Discharge Summary Progress towards goals / Updated goals: 
nt 
 
PLAN 
[]  Upgrade activities as tolerated     [x]  Continue plan of care 
[]  Update interventions per flow sheet      
[]  Discharge due to:_ 
[]  Other:  
 
Denice Ortiz, PT 8/3/2018  10:32 AM

## 2018-08-03 NOTE — PROGRESS NOTES
[facility] Physical Therapy [address] [facility], [address] [place] [address] Phone: [phone] Fax: [phone] Continued Plan of Care/ Re-certification for Physical Therapy Services Madiha Hayden [id number] Renan Webster*   Provider # [id number] Diagnosis: Pain in right shoulder [M25.511] Onset Date: DOS 4/9/2018 Prior Hospitalization: none Visits from Start of Care: 28 Missed Visits: 0 Start of Care: 5/1/2018 Prior Level of Function: able to use R UE to reach overhead, lift & carry items, dress, bath, perform household chores, exercise, play guitar, sleep through night w/out pain/limitation The Plan of Care and following information is based on the patient's current status: 
 
ROM:  [] Unable to assess at this time 
[facility] 
   Right Left    Right Left Flexion 89 164 Flexion 120 158 Scaption/ABD 85 160 Scaption/ nt  
ER @ 0 Degrees 18 50 ER @ 0 Degrees nt nt ER @ 90 Degrees nt nt ER @ 45/90 Degrees 20 75 IR @ 90 Degrees nt nt IR @ 45/90 Degrees To abdomen  98 Functional ER R Ear/back of head T4           
Functional IR nt T5           
Elbow Flexion 145 pain wnl Elbow Flexion 145 pain wnl Elbow Extension -10 pain wnl Elbow Extension -10 pain wnl  
   
Strength:   [] Unable to assess at this time                                                                          
MMT (0-5) (within avail ranges) 
   R (1-5) L (1-5) Shoulder Flexion 4 5 Shoulder Abduction <3 pain 5 Shoulder ER 4 5 Shoulder IR 4 5 Supraspinatus 4 5 Elbow Flexion 5 5 Elbow Extension 5 5  
   
Palpation:  
[] Min  [x] Mod  [] Severe    Location: SC joint [x] Min  [] Mod  [] Severe    Location: R UT, infraspinatus, subscap, lev scapula mm., incr m.  Tone/banding   
   
Outcome Measure:  
Patients Intake FS Score was 23 initially placing the patient in Stage 1. 
Patients FS score now is 55 out of 100 (32 points of functional change 
since intake), placing the patient in Stage 3 and means patient has fair 
shoulder function. 
 
 Short Term Goals: To be accomplished in 2-3 weeks: 
1) Patient will be independent with HEP Met 
2) Patient will increase R shoulder flex PROM to >/=115 degrees to work toward normalization of motion Met 
3) Patient will report >/= 25% decrease in pain Met 4) Patient will increase R elbow AROM ext to neutral for normalization of motion  Not met 5) Patient will demonstrate understanding/application of ice utilization recommendations Partially met  
[address] be accomplished in 8-10 weeks: Progressing towards LTG's 
1) Patient will report >/= 75% decrease in pain not met 2) Patient will demonstrate independent with modified home/gym program without aggravation of shoulder pain not met 3) Patient will increase R shoulder flex AROM to >/= 150 deg so that can reach overhead items in closet not met 4) Patient will increase R shoulder IR behind back AROM to >/= L1 so that can tuck in shirt not met 5) Patient will increase R shldr ER functional AROM to >/= T3 so that can wash back not met Key functional changes:  
Increased PROM & AROM R UE allowing for increased use w/ ADLs including dressing, bathing, driving, assisting w/ care for girlfriend Problems/ barriers to goal attainment:  
Pt has had high pain levels limiting his tolerance to PT; initially w/ significant swelling and pain R elbow which continues to be limited; pt w/ difficulty tolerating prescribed meds due to GI issues; pt is scheduled for consult w/ pain management MD to discuss options to manage pain    
 
Problem List: pain affecting function, decrease ROM, decrease strength, decrease ADL/ functional abilitiies and decrease activity tolerance Treatment Plan: Therapeutic exercise, Therapeutic activities, Neuromuscular re-education, Physical agent/modality, Manual therapy and Patient education G-Codes (GP) Carry  Current  CK= 40-59%  Goal  CJ= 20-39% The severity rating is based on the FOTO Score score. Goals for this certification period to be accomplished in 6-8 weeks: 
 
1) Patient will increase R elbow AROM ext to neutral for normalization of motion 2) Patient will demonstrate understanding/application of ice utilization recommendations 3) Patient will report >/= 75% decrease in pain 4) Patient will demonstrate independent with modified home/gym program without aggravation of shoulder pain 5) Patient will increase R shoulder flex AROM to >/= 150 deg so that can reach overhead items in closet 6) Patient will increase R shoulder IR behind back AROM to >/= L1 so that can tuck in shirt 7) Patient will increase R shldr ER functional AROM to >/= T3 so that can wash back Frequency / Duration: Patient to be seen 1-2 times per week for 6-8 weeks: 
 
Certification Period: 7/28/2018-10/24/2018 Archie Mata, PT 8/3/2018 12:57 PM 
 
________________________________________________________________________ I certify that the above Therapy Services are being furnished while the patient is under my care. I agree with the treatment plan and certify that this therapy is necessary. Y or N I have read the above and request that my patient continue as recommended. Y or N I have read the above report and request that my patient continue therapy with the following changes/special instructions Y or N I have read the above report and request that my patient be discharged from therapy [de-identified] Signature:_________________ Date:___________Time:__________

## 2018-08-07 ENCOUNTER — HOSPITAL ENCOUNTER (OUTPATIENT)
Dept: PHYSICAL THERAPY | Age: 68
Discharge: HOME | End: 2018-08-07
Payer: MEDICARE

## 2018-08-07 PROCEDURE — 97140 MANUAL THERAPY 1/> REGIONS: CPT | Performed by: PHYSICAL THERAPY ASSISTANT

## 2018-08-07 PROCEDURE — 97110 THERAPEUTIC EXERCISES: CPT | Performed by: PHYSICAL THERAPY ASSISTANT

## 2018-08-07 NOTE — PROGRESS NOTES
PT DAILY TREATMENT NOTE - [facility] 2-15    Patient Name: Marina Leavitt  Date:2018  : 1950  [x]  Patient  Verified  Payor: Frannie Garibay / Plan: [place] MEDICARE PART A & B / Product Type: Medicare /    In time: 1:30 PM  Out time: 2:30 PM  Total Treatment Time (min): 70  Total Timed Codes (min): 60  1:1 Treatment Time ( [address] only): 40  Viisit #: 29  Pain Management: 18  Neuro for EM18    Treatment Area: Pain in right shoulder [M25.511]    SUBJECTIVE   Pain Level (0-10 scale): 8/10  Any medication changes, allergies to medications, adverse drug reactions, diagnosis change, or new procedure performed?: [x] No    [] Yes (see summary sheet for update)  Subjective functional status/changes:   [] No changes reported    Patient reports \"the doctors are all in the same building and they keep sending me to a different one to make themselves feel better. \" Patient states \"I had to take 2 doses of pain pills last night because I lie in bed for 3 hours before I can fall asleep because of the pain. \" \"I even put the lidocaine cream on my knee and it didn't do anything for that!.\"    OBJECTIVE    Modality rationale: decrease edema and decrease pain to improve the patients ability to improve motion   Min Type Additional Details    [] Estim: []Att   []Unatt        []TENS instruct                  []IFC  []Premod   []NMES                     []Other:  []w/US   []w/ice   []w/heat  Position:  Location:    []  Traction: [] Cervical       []Lumbar                       [] Prone          []Supine                       []Intermittent   []Continuous Lbs:  [] before manual  [] after manual  []w/heat    []  Ultrasound: []Continuous   [] Pulsed at:                           []1MHz   []3MHz Location:  W/cm2:    [] Paraffin         Location:   []w/heat   10   [x]  Ice post    []  Heat pre w/ PREs  []  Ice massage Position:  Seated   Location: R shldr     []  Laser  []  Other: Position:  Location:      []  Vasopneumatic Device Pressure:       [] lo [] med [] hi   Temperature:      [x] Skin assessment post-treatment:  [x]intact []redness- no adverse reaction    []redness  adverse reaction:     45 min Therapeutic Exercise:  [x] See flow sheet : incr resistance/reps as per chart, added doorway \"A\" pect. Stretch, standing shoulder flexion and scaption to 90 degrees   Rationale: increase ROM to improve the patients ability to improve function      15 min Manual Therapy:   PROM right shoulder flexion, scaption ER, elbow ext     Rationale: increase ROM and increase tissue extensibility to improve the patients ability to improve motion            With   [x] TE   [] TA   [] neuro   [] other: Patient Education: [x] Review HEP    [] Progressed/Changed HEP based on:   [] positioning   [] body mechanics   [] transfers   [x] heat/ice application    [] other:      Other Objective/Functional Measures:    nt    Pain Level (0-10 scale) post treatment: 8-9/10      ASSESSMENT/Changes in Function:   Frequent cues to avoid R shoulder hiking during standing shoulder flexion and scaption, improved with cues. Tolerated doorway \"A\" pectoralis stretch well and encouraged to perform gently at home. Patient continues to express frustration regarding continued high pain levels and inability to sleep. Advised patient to call pain management if he has any concerns  With what was prescribed. Also advised patient to not take more than what is prescribed for the safety of the patient. Patient will continue to benefit from skilled PT services to modify and progress therapeutic interventions, address functional mobility deficits, address ROM deficits, address strength deficits, analyze and address soft tissue restrictions, analyze and cue movement patterns and analyze and modify body mechanics/ergonomics to attain remaining goals.      [x]  See Plan of Care  []  See progress note/recertification  []  See Discharge Summary         Progress towards goals / Updated goals:  nt    PLAN  []  Upgrade activities as tolerated     [x]  Continue plan of care  []  Update interventions per flow sheet       []  Discharge due to:_  []  Other:     Nicole Angelo PTA 8/7/2018  1:30 AM

## 2018-08-10 ENCOUNTER — HOSPITAL ENCOUNTER (OUTPATIENT)
Dept: PHYSICAL THERAPY | Age: 68
Discharge: HOME | End: 2018-08-10
Payer: MEDICARE

## 2018-08-10 PROCEDURE — G8984 CARRY CURRENT STATUS: HCPCS

## 2018-08-10 PROCEDURE — 97140 MANUAL THERAPY 1/> REGIONS: CPT | Performed by: PHYSICAL THERAPY ASSISTANT

## 2018-08-10 PROCEDURE — 97110 THERAPEUTIC EXERCISES: CPT | Performed by: PHYSICAL THERAPY ASSISTANT

## 2018-08-10 PROCEDURE — G8985 CARRY GOAL STATUS: HCPCS

## 2018-08-14 ENCOUNTER — HOSPITAL ENCOUNTER (OUTPATIENT)
Dept: PHYSICAL THERAPY | Age: 68
Discharge: HOME | End: 2018-08-14
Payer: MEDICARE

## 2018-08-14 PROCEDURE — 97140 MANUAL THERAPY 1/> REGIONS: CPT | Performed by: PHYSICAL THERAPY ASSISTANT

## 2018-08-14 PROCEDURE — 97110 THERAPEUTIC EXERCISES: CPT | Performed by: PHYSICAL THERAPY ASSISTANT

## 2018-08-14 NOTE — PROGRESS NOTES
PT DAILY TREATMENT NOTE - [facility] 2-15    Patient Name: Rubi Johnson  Date:2018  : 1950  [x]  Patient  Verified  Payor: [employer]s / Plan: [place] MEDICARE PART A & B / Product Type: Medicare /    In time: 2:00  PM  Out time: 3:10 PM  Total Treatment Time (min): 70  Total Timed Codes (min): 60  1:1 Treatment Time ( [address] only): 54  Viisit #: 30  Pain Management: 18  Neuro for EM18    Treatment Area: Pain in right shoulder [M25.511]    SUBJECTIVE   Pain Level (0-10 scale): 5/10  Any medication changes, allergies to medications, adverse drug reactions, diagnosis change, or new procedure performed?: [x] No    [] Yes (see summary sheet for update)  Subjective functional status/changes:   [] No changes reported    Patient states \"As good as to be expected. \"    OBJECTIVE    Modality rationale: decrease edema and decrease pain to improve the patients ability to improve motion   Min Type Additional Details    [] Estim: []Att   []Unatt        []TENS instruct                  []IFC  []Premod   []NMES                     []Other:  []w/US   []w/ice   []w/heat  Position:  Location:    []  Traction: [] Cervical       []Lumbar                       [] Prone          []Supine                       []Intermittent   []Continuous Lbs:  [] before manual  [] after manual  []w/heat    []  Ultrasound: []Continuous   [] Pulsed at:                           []1MHz   []3MHz Location:  W/cm2:    [] Paraffin         Location:   []w/heat   10   [x]  Ice post    []  Heat pre w/ PREs  []  Ice massage Position:  Seated   Location: R shldr     []  Laser  []  Other: Position:  Location:      []  Vasopneumatic Device Pressure:       [] lo [] med [] hi   Temperature:      [x] Skin assessment post-treatment:  [x]intact []redness- no adverse reaction    []redness  adverse reaction:     45 min Therapeutic Exercise:  [x] See flow sheet :  Added supine SA punches, progressed weights/reps per flow sheet   Rationale: increase ROM to improve the patients ability to improve function      15 min Manual Therapy:   PROM right shoulder flexion, scaption, IR in 30 degrees scapular plane ER, elbow ext     Rationale: increase ROM and increase tissue extensibility to improve the patients ability to improve motion            With   [x] TE   [] TA   [] neuro   [] other: Patient Education: [x] Review HEP    [] Progressed/Changed HEP based on:   [] positioning   [] body mechanics   [] transfers   [x] heat/ice application    [] other:      Other Objective/Functional Measures:    nt    Pain Level (0-10 scale) post treatment: not reported      ASSESSMENT/Changes in Function:   Patient continues to have difficulty with AROM due to pain and weakness. Challenged with progressed exercises especially ball on wall due to fatigue. Patient will continue to benefit from skilled PT services to modify and progress therapeutic interventions, address functional mobility deficits, address ROM deficits, address strength deficits, analyze and address soft tissue restrictions, analyze and cue movement patterns and analyze and modify body mechanics/ergonomics to attain remaining goals.      [x]  See Plan of Care  []  See progress note/recertification  []  See Discharge Summary         Progress towards goals / Updated goals:  nt    PLAN  []  Upgrade activities as tolerated     [x]  Continue plan of care  []  Update interventions per flow sheet       []  Discharge due to:_  []  Other:     Gina Turner PTA 8/14/2018  2:00 PM

## 2018-08-17 ENCOUNTER — HOSPITAL ENCOUNTER (OUTPATIENT)
Dept: PHYSICAL THERAPY | Age: 68
Discharge: HOME | End: 2018-08-17
Payer: MEDICARE

## 2018-08-17 PROCEDURE — 97110 THERAPEUTIC EXERCISES: CPT

## 2018-08-17 PROCEDURE — 97140 MANUAL THERAPY 1/> REGIONS: CPT

## 2018-08-17 NOTE — PROGRESS NOTES
PT DAILY TREATMENT NOTE - [facility] 2-15    Patient Name: Rogelio Garcia  Date:2018  : 1950  [x]  Patient  Verified  Payor: VA MEDICARE / Plan: VA MEDICARE PART A & B / Product Type: Medicare /    In time: 852 AM  Out time: 200 AM  Total Treatment Time (min): 80  Total Timed Codes (min): 70  1:1 Treatment Time (MC only): 40  Viisit #: 31    Treatment Area: Pain in right shoulder [M25.511]    SUBJECTIVE   Pain Level (0-10 scale): 6/10  Any medication changes, allergies to medications, adverse drug reactions, diagnosis change, or new procedure performed?: [x] No    [] Yes (see summary sheet for update)  Subjective functional status/changes:   [] No changes reported    Patient reports \"pretty tired after helping grandsons trim bushes at the house yesterday\"     OBJECTIVE    Modality rationale: decrease edema and decrease pain to improve the patients ability to improve motion   Min Type Additional Details    [] Estim: []Att   []Unatt        []TENS instruct                  []IFC  []Premod   []NMES                     []Other:  []w/US   []w/ice   []w/heat  Position:  Location:    []  Traction: [] Cervical       []Lumbar                       [] Prone          []Supine                       []Intermittent   []Continuous Lbs:  [] before manual  [] after manual  []w/heat    []  Ultrasound: []Continuous   [] Pulsed at:                           []1MHz   []3MHz Location:  W/cm2:    [] Paraffin         Location:   []w/heat   10   [x]  Ice post    []  Heat pre w/ PREs  []  Ice massage Position:  Seated   Location: R shldr     []  Laser  []  Other: Position:  Location:      []  Vasopneumatic Device Pressure:       [] lo [] med [] hi   Temperature:      [x] Skin assessment post-treatment:  [x]intact []redness- no adverse reaction    []redness  adverse reaction:     55 min Therapeutic Exercise:  [x] See flow sheet :  progressed weights/reps per flow sheet   Rationale: increase ROM to improve the patients ability to improve function      15 min Manual Therapy:   PROM right shoulder flexion, scaption, IR in 30 degrees scapular plane ER, elbow ext, post & inf SC mobs      Rationale: increase ROM and increase tissue extensibility to improve the patients ability to improve motion            With   [x] TE   [] TA   [] neuro   [] other: Patient Education: [x] Review HEP    [] Progressed/Changed HEP based on:   [] positioning   [] body mechanics   [] transfers   [x] heat/ice application    [] other:      Other Objective/Functional Measures:    nt    Pain Level (0-10 scale) post treatment: 4/10      ASSESSMENT/Changes in Function:     Pt kim SC joint mobs well, no report of pain at [place] joint     Patient will continue to benefit from skilled PT services to modify and progress therapeutic interventions, address functional mobility deficits, address ROM deficits, address strength deficits, analyze and address soft tissue restrictions, analyze and cue movement patterns and analyze and modify body mechanics/ergonomics to attain remaining goals.      [x]  See Plan of Care  []  See progress note/recertification  []  See Discharge Summary         Progress towards goals / Updated goals:  nt    PLAN  []  Upgrade activities as tolerated     [x]  Continue plan of care  []  Update interventions per flow sheet       []  Discharge due to:_  []  Other:     Eyad Landeros, PT 8/17/2018  1004 AM

## 2018-08-28 ENCOUNTER — HOSPITAL ENCOUNTER (OUTPATIENT)
Dept: PHYSICAL THERAPY | Age: 68
Discharge: HOME | End: 2018-08-28
Payer: MEDICARE

## 2018-08-28 PROCEDURE — 97110 THERAPEUTIC EXERCISES: CPT

## 2018-08-28 PROCEDURE — 97140 MANUAL THERAPY 1/> REGIONS: CPT

## 2018-08-28 NOTE — PROGRESS NOTES
PT DAILY TREATMENT NOTE - [facility] 2-15 Patient Name: Nikole Santiago Date:2018 : 1950 [x]  Patient  Verified Payor: [place] / Plan: Melissa Chaudhari / Product Type: Medicare / In time: 120 PM  Out time: 240 PM 
Total Treatment Time (min):70 Total Timed Codes (min): 60 
1:1 Treatment Time (MC only): 40 Viisit #: 32 Treatment Area: Pain in right shoulder [M25.511] SUBJECTIVE Pain Level (0-10 scale): 6/10 Any medication changes, allergies to medications, adverse drug reactions, diagnosis change, or new procedure performed?: [x] No    [] Yes (see summary sheet for update) Subjective functional status/changes:   [] No changes reported Patient reports had f/u appt w/ pain management MD scheduled for this afternoon but cancelled it \"no sense going back if he is just going to give me lyrica\"; reports continued high pain levels OBJECTIVE Modality rationale: decrease edema and decrease pain to improve the patients ability to improve motion Min Type Additional Details  
 [] Estim: []Att   []Unatt        []TENS instruct []IFC  []Premod   []NMES []Other:  []w/US   []w/ice   []w/heat Position: Location:  
 []  Traction: [] Cervical       []Lumbar 
                     [] Prone          []Supine []Intermittent   []Continuous Lbs: 
[] before manual 
[] after manual 
[]w/heat  
 []  Ultrasound: []Continuous   [] Pulsed at: 
                         []1MHz   []3MHz Location: 
W/cm2:  
 [] Paraffin Location:  
[]w/heat  
10 [x]  Ice post    []  Heat pre w/ PREs []  Ice massage Position:  Seated Location: R shldr   
 []  Laser 
[]  Other: Position: Location:  
 
 []  Vasopneumatic Device Pressure:       [] lo [] med [] hi  
Temperature:   
 
[x] Skin assessment post-treatment:  [x]intact []redness- no adverse reaction 
  []redness  adverse reaction: 45 min Therapeutic Exercise:  [x] See flow sheet :  progressed weights/reps per flow sheet Rationale: increase ROM to improve the patients ability to improve function 15 min Manual Therapy:   PROM right shoulder flexion, scaption, IR in 30 degrees scapular plane ER, elbow ext, post & inf SC mobs Rationale: increase ROM and increase tissue extensibility to improve the patients ability to improve motion With 
 [x] TE 
 [] TA 
 [] neuro 
 [] other: Patient Education: [x] Review HEP [] Progressed/Changed HEP based on:  
[] positioning   [] body mechanics   [] transfers   [x] heat/ice application   
[] other:   
 
Other Objective/Functional Measures:   
nt 
 
Pain Level (0-10 scale) post treatment: 4/10 ASSESSMENT/Changes in Function:  
 
Advised pt returning to MD or scheduling w/ new pain management MD would be in best interest as progress has reached a plateau due to cont high pain levels Patient will continue to benefit from skilled PT services to modify and progress therapeutic interventions, address functional mobility deficits, address ROM deficits, address strength deficits, analyze and address soft tissue restrictions, analyze and cue movement patterns and analyze and modify body mechanics/ergonomics to attain remaining goals. [x]  See Plan of Care 
[]  See progress note/recertification 
[]  See Discharge Summary Progress towards goals / Updated goals: 
nt 
 
PLAN 
[]  Upgrade activities as tolerated     [x]  Continue plan of care 
[]  Update interventions per flow sheet      
[]  Discharge due to:_ 
[]  Other:  
 
Corrinne Schilder, PT 8/28/2018  129 PM

## 2018-08-31 ENCOUNTER — HOSPITAL ENCOUNTER (OUTPATIENT)
Dept: PHYSICAL THERAPY | Age: 68
Discharge: HOME | End: 2018-08-31
Payer: MEDICARE

## 2018-08-31 PROCEDURE — 97140 MANUAL THERAPY 1/> REGIONS: CPT

## 2018-08-31 PROCEDURE — 97110 THERAPEUTIC EXERCISES: CPT

## 2018-08-31 NOTE — PROGRESS NOTES
PT DAILY TREATMENT NOTE - [facility] 2-15 Patient Name: Rogelio Garcia Date:2018 : 1950 [x]  Patient  Verified Payor: [place] MEDICARE / Plan: Melissa Shaffer [facility] / Product Type: Medicare / In time: 950 AM  Out time: 1100 AM 
Total Treatment Time (min): 70 Total Timed Codes (min): 60 
1:1 Treatment Time (MC only): 55 Viisit #: 35 Treatment Area: Pain in right shoulder [M25.511] SUBJECTIVE Pain Level (0-10 scale): 4/10 Any medication changes, allergies to medications, adverse drug reactions, diagnosis change, or new procedure performed?: [x] No    [] Yes (see summary sheet for update) Subjective functional status/changes:   [] No changes reported Patient reports \"pretty good today\" OBJECTIVE Modality rationale: decrease edema and decrease pain to improve the patients ability to improve motion Min Type Additional Details  
 [] Estim: []Att   []Unatt        []TENS instruct []IFC  []Premod   []NMES []Other:  []w/US   []w/ice   []w/heat Position: Location:  
 []  Traction: [] Cervical       []Lumbar 
                     [] Prone          []Supine []Intermittent   []Continuous Lbs: 
[] before manual 
[] after manual 
[]w/heat  
 []  Ultrasound: []Continuous   [] Pulsed at: 
                         []1MHz   []3MHz Location: 
W/cm2:  
 [] Paraffin Location:  
[]w/heat  
10 [x]  Ice post    []  Heat pre w/ PREs []  Ice massage Position:  Seated Location: R shldr   
 []  Laser 
[]  Other: Position: Location:  
 
 []  Vasopneumatic Device Pressure:       [] lo [] med [] hi  
Temperature:   
 
[x] Skin assessment post-treatment:  [x]intact []redness- no adverse reaction 
  []redness  adverse reaction:  
 
45 min Therapeutic Exercise:  [x] See flow sheet :  progressed weights/reps per flow sheet Rationale: increase ROM to improve the patients ability to improve function 15 min Manual Therapy:   PROM right shoulder flexion, scaption, IR in 30 degrees scapular plane ER, elbow ext, post & inf SC mobs Rationale: increase ROM and increase tissue extensibility to improve the patients ability to improve motion With 
 [x] TE 
 [] TA 
 [] neuro 
 [] other: Patient Education: [x] Review HEP [] Progressed/Changed HEP based on:  
[] positioning   [] body mechanics   [] transfers   [x] heat/ice application   
[] other:   
 
Other Objective/Functional Measures:   
nt 
 
Pain Level (0-10 scale) post treatment: 5-6/10 ASSESSMENT/Changes in Function:  
 
Gradual progress w/ RC/scap strength, cont stiffness w/ PROM Patient will continue to benefit from skilled PT services to modify and progress therapeutic interventions, address functional mobility deficits, address ROM deficits, address strength deficits, analyze and address soft tissue restrictions, analyze and cue movement patterns and analyze and modify body mechanics/ergonomics to attain remaining goals. [x]  See Plan of Care 
[]  See progress note/recertification 
[]  See Discharge Summary Progress towards goals / Updated goals: 
nt 
 
PLAN 
[]  Upgrade activities as tolerated     [x]  Continue plan of care 
[]  Update interventions per flow sheet      
[]  Discharge due to:_ 
[x]  Other: reassessment, discuss POC Meagan Hester, PT 8/31/2018  1024 AM

## 2018-09-06 ENCOUNTER — HOSPITAL ENCOUNTER (OUTPATIENT)
Dept: PHYSICAL THERAPY | Age: 68
Discharge: HOME | End: 2018-09-06
Payer: MEDICARE

## 2018-09-06 PROCEDURE — 97140 MANUAL THERAPY 1/> REGIONS: CPT

## 2018-09-06 PROCEDURE — G8985 CARRY GOAL STATUS: HCPCS

## 2018-09-06 PROCEDURE — 97110 THERAPEUTIC EXERCISES: CPT

## 2018-09-06 PROCEDURE — G8986 CARRY D/C STATUS: HCPCS

## 2018-09-06 NOTE — PROGRESS NOTES
DAILY TREATMENT NOTE/PROGRESS NOTE/DISCHARGE SUMMARY - [facility] 2-15 Patient Name: Ekaterina Malone Date:2018 : 1950 [x]  Patient  Verified Payor: [place] MEDICARE / Plan: Melissa Shaffer y / Product Type: Medicare / In time: 930 AM  Out time: 1045 AM 
Total Treatment Time (min): 75 Total Timed Codes (min): 60 
1:1 Treatment Time (MC only): 60 
Viisit #: 34 Treatment Area: Pain in right shoulder [M25.511] SUBJECTIVE Pain Level (0-10 scale): 7/10 Any medication changes, allergies to medications, adverse drug reactions, diagnosis change, or new procedure performed?: [x] No    [] Yes (see summary sheet for update) Subjective functional status/changes:   [] No changes reported Patient reports \"can tell getting muscles back at least\"; continues to be discouraged by high pain levels and \"clicking and crunching\" in his shoulder; pt reports he feels comfortable to continue exercises at home at this time OBJECTIVE Modality rationale: decrease edema and decrease pain to improve the patients ability to improve motion Min Type Additional Details  
 [] Estim: []Att   []Unatt        []TENS instruct []IFC  []Premod   []NMES []Other:  []w/US   []w/ice   []w/heat Position: Location:  
 []  Traction: [] Cervical       []Lumbar 
                     [] Prone          []Supine []Intermittent   []Continuous Lbs: 
[] before manual 
[] after manual 
[]w/heat  
 []  Ultrasound: []Continuous   [] Pulsed at: 
                         []1MHz   []3MHz Location: 
W/cm2:  
 [] Paraffin Location:  
[]w/heat  
15 [x]  Ice post    []  Heat pre w/ PREs []  Ice massage Position:  Seated Location: R shldr   
 []  Laser 
[]  Other: Position: Location:  
 
 []  Vasopneumatic Device Pressure:       [] lo [] med [] hi  
Temperature:   
 
[x] Skin assessment post-treatment:  [x]intact []redness- no adverse reaction []redness  adverse reaction:  
 
45 min Therapeutic Exercise:  [x] See flow sheet : reassessment performed, review recommendation HEP Rationale: increase ROM to improve the patients ability to improve function 15 min Manual Therapy:   PROM right shoulder flexion, scaption ER, elbow ext Rationale: increase ROM and increase tissue extensibility to improve the patients ability to improve motion With 
 [x] TE 
 [] TA 
 [] neuro 
 [] other: Patient Education: [x] Review HEP, review recommendations HEP [] Progressed/Changed HEP based on:  
[] positioning   [] body mechanics   [] transfers   [x] heat/ice application   
[] other:   
 
Other Objective/Functional Measures:   
 
ROM:  [] Unable to assess at this time 
[facility] 
   Right Left    Right Left Flexion 95 164 Flexion 138 158 Scaption/ABD 91 160 Scaption/ nt  
ER @ 0 Degrees 24 50 ER @ 0 Degrees nt nt ER @ 90 Degrees nt nt ER @ 45/90 Degrees 38 75 IR @ 90 Degrees nt nt IR @ 96/43 TEJFIPY 11  45 Functional ER R Ear/back of head T4           
Functional IR L5 T5           
Elbow Flexion 145 pain wnl Elbow Flexion 145  wnl Elbow Extension -15 pain wnl Elbow Extension -8 pain wnl  
   
Strength:   [] Unable to assess at this time                                                                          
MMT (0-5)  
   R (1-5) L (1-5) Shoulder Flexion 5 5 Shoulder Abduction 5 5 Shoulder ER 4 5 Shoulder IR 5 5 Supraspinatus 4 5 Elbow Flexion 5 5 Elbow Extension 5 5  
  **shoulder ER and supraspinatus MMT taken within available range 4/5 each Palpation:  
Mild tender R SC joint FOTO Outcome Measure: 57/100 (was 23 initially placing the patient in Stage 1) G-Codes (GP) Carry  Goal  CJ= 20-39%  D/C  CK= 40-59% Pain Level (0-10 scale) post treatment:4 /10 ASSESSMENT/Changes in Function:  
Patient has been seen for 34 skilled PT visits, he has improved shoulder AROM & PROM flexion and scaption,ER, IR and improved strength however continues to present with significant decreased AROM secondary to weakness and pain. His strength in available range has improved. He reports high pain levels despite normal results from EMG (per patient report other than R carpal tunnel) and he has not had relief from Lyrica prescribed by pain management which he is very discouraged about; he has decided not to follow up with initial pain management MD however we have encouraged him to schedule with another pain management MD to discuss his concerns. Patient has reached 3 of 10 goals set at initial evaluation. At this point patient has reached a plateau with progress and demonstrates understanding of independent program recommendations   
  
[]  See Plan of Care 
[]  See progress note/recertification 
[x]  See Discharge Summary Progress towards goals / Updated goals: 
Short Term Goals: To be accomplished in 2-3 weeks: 
1) Patient will be independent with HEP Met 
2) Patient will increase R shoulder flex PROM to >/=115 degrees to work toward normalization of motion Not Met 
3) Patient will report >/= 25% decrease in pain Met 4) Patient will increase R elbow AROM ext to neutral for normalization of motion  Not met 5) Patient will demonstrate understanding/application of ice utilization recommendations met  
   
Long Term Goals: To be accomplished in 8-10 weeks:  
1) Patient will report >/= 75% decrease in pain Not Met 
2) Patient will demonstrate independent with modified home/gym program without aggravation of shoulder pain Not met 3) Patient will increase R shoulder flex AROM to >/= 150 deg so that can reach overhead items in closet Not Met 
4) Patient will increase R shoulder IR behind back AROM to >/= L1 so that can tuck in shirt  Not Met 
 5) Patient will increase R shldr ER functional AROM to >/= T3 so that can wash back Not Met PLAN 
[]  Upgrade activities as tolerated     [x]  Continue plan of care 
[]  Update interventions per flow sheet [x]  Discharge due to: patient has reached a plateau, to continue w/ ex independently, f/u w/ MD and pain management MD PRN  
[]  Other:  
  
Karthik Bowles, PT 9/6/2018   10:00 AM

## 2018-09-10 NOTE — ANCILLARY DISCHARGE INSTRUCTIONS
[facility] [address] [place], [address] [facility] Phone: [phone] Fax: [phone] Discharge Summary 2-15 Patient name: James Cash                                   : 1950 Provider#: [id number] Referral source: [facility]/Treatment Diagnosis: Presence of unspecified artificial shoulder joint [Z96.619] Primary osteoarthritis, unspecified shoulder [M19.019] Prior Hospitalization: see medical history Comorbidities: DM II controlled w/ meds, L ankle fx ~ , L knee pain    
Prior Level of Function: able to use R UE to reach overhead, lift & carry items, dress, bath, perform household chores, exercise, play guitar, sleep through night w/out pain/limitation Medications: Verified on Patient Summary List 
Start of Care: 2018 Onset Date: DOS 2018 Visits from Start of Care: 34   Missed Visits: 0 Reporting Period : 2018 to 2018 Summary of care:  
 
Treatment consisted of soft tissue massage, ROM, RC/scap strength & stability ex, functional activities, moist heat, ice. At the time of last visit with report of Patient reports \"can tell getting muscles back at least\"; continues to be discouraged by high pain levels and \"clicking and crunching\" in his shoulder; pt reports he feels comfortable to continue exercises at home at this time Presents with the following Objective/functional measures: Other Objective/Functional Measures:   
 
ROM:  [] Unable to assess at this time                                            AROM                                                                PROM 
   Right Left    Right Left Flexion 95 164 Flexion 138 158 Scaption/ABD 91 160 Scaption/ nt  
ER @ 0 Degrees 24 50 ER @ 0 Degrees nt nt ER @ 90 Degrees nt nt ER @ 45/90 Degrees 38 75 IR @ 90 Degrees nt nt IR @ 44/44 NHDNLCA 46  15 Functional ER R Ear/back of head T4           
Functional IR L5 T5           
Elbow Flexion 145 pain wnl Elbow Flexion 145  wnl Elbow Extension -15 pain wnl Elbow Extension -8 pain wnl  
   
Strength:   [] Unable to assess at this time                                                                          
MMT (0-5)  
   R (1-5) L (1-5) Shoulder Flexion 5 5 Shoulder Abduction 5 5 Shoulder ER 4 5 Shoulder IR 5 5 Supraspinatus 4 5 Elbow Flexion 5 5 Elbow Extension 5 5  
  **shoulder ER and supraspinatus MMT taken within available range 4/5 each Palpation:  
Mild tender R SC joint FOTO Outcome Measure: 57/100 (was 23 initially placing the patient in Stage 1) G-Codes (GP) Carry  Goal  CJ= 20-39%  D/C  CK= 40-59% The severity rating is based on clinical judgment and the FOTO Score score. Progress towards goals / Updated goals: 
Short Term Goals: To be accomplished in 2-3 weeks: 
1) Patient will be independent with HEP Met 
2) Patient will increase R shoulder flex PROM to >/=115 degrees to work toward normalization of motion Not Met 
3) Patient will report >/= 25% decrease in pain Met 4) Patient will increase R elbow AROM ext to neutral for normalization of motion  Not met 5) Patient will demonstrate understanding/application of ice utilization recommendations met  
   
Long Term Goals: To be accomplished in 8-10 weeks:  
1) Patient will report >/= 75% decrease in pain Not Met 
2) Patient will demonstrate independent with modified home/gym program without aggravation of shoulder pain Not met 3) Patient will increase R shoulder flex AROM to >/= 150 deg so that can reach overhead items in closet Not Met 
4) Patient will increase R shoulder IR behind back AROM to >/= L1 so that can tuck in shirt  Not Met 
5) Patient will increase R shldr ER functional AROM to >/= T3 so that can wash back Not Met Assessment/Summary of care:  
 
Patient has been seen for 34 skilled PT visits, he has improved shoulder AROM & PROM flexion and scaption,ER, IR and improved strength however continues to present with significant decreased AROM secondary to weakness and pain. His strength in available range has improved. He reports high pain levels despite normal results from EMG (per patient report other than R carpal tunnel) and he has not had relief from Lyrica prescribed by pain management which he is very discouraged about; he has decided not to follow up with initial pain management MD however we have encouraged him to schedule with another pain management MD to discuss his concerns. Patient has reached 3 of 10 goals set at initial evaluation. At this point patient has reached a plateau with progress and demonstrates understanding of independent program recommendations RECOMMENDATIONS: 
[x]Discontinue therapy: patient has reached a plateau, to continue w/ ex independently, f/u w/ MD and pain management MD MONA Skinner, PT 9/10/2018 11:58 AM

## 2018-09-18 ENCOUNTER — HOSPITAL ENCOUNTER (EMERGENCY)
Age: 68
Discharge: HOME | End: 2018-09-18
Attending: EMERGENCY MEDICINE
Payer: MEDICARE

## 2018-09-18 ENCOUNTER — APPOINTMENT (OUTPATIENT)
Dept: CT IMAGING | Age: 68
End: 2018-09-18
Attending: PHYSICIAN ASSISTANT
Payer: MEDICARE

## 2018-09-18 ENCOUNTER — APPOINTMENT (OUTPATIENT)
Dept: GENERAL RADIOLOGY | Age: 68
End: 2018-09-18
Attending: PHYSICIAN ASSISTANT
Payer: MEDICARE

## 2018-09-18 VITALS
HEART RATE: 95 BPM | TEMPERATURE: 98.5 F | RESPIRATION RATE: 18 BRPM | SYSTOLIC BLOOD PRESSURE: 119 MMHG | DIASTOLIC BLOOD PRESSURE: 77 MMHG | OXYGEN SATURATION: 97 % | HEIGHT: 69 IN | BODY MASS INDEX: 30.01 KG/M2 | WEIGHT: 202.6 LBS

## 2018-09-18 DIAGNOSIS — D69.6 THROMBOCYTOPENIA (HCC): Primary | ICD-10-CM

## 2018-09-18 DIAGNOSIS — M25.429 ELBOW SWELLING, UNSPECIFIED LATERALITY: ICD-10-CM

## 2018-09-18 LAB
ALBUMIN SERPL-MCNC: 3.9 G/DL (ref 3.5–5)
ALBUMIN/GLOB SERPL: 0.9 {RATIO} (ref 1.1–2.2)
ALP SERPL-CCNC: 47 U/L (ref 45–117)
ALT SERPL-CCNC: 48 U/L (ref 12–78)
ANION GAP SERPL CALC-SCNC: 10 MMOL/L (ref 5–15)
APTT PPP: 24.7 SEC (ref 22.1–32)
AST SERPL-CCNC: 24 U/L (ref 15–37)
BILIRUB SERPL-MCNC: 0.4 MG/DL (ref 0.2–1)
BUN SERPL-MCNC: 22 MG/DL (ref 6–20)
BUN/CREAT SERPL: 19 (ref 12–20)
CALCIUM SERPL-MCNC: 9.1 MG/DL (ref 8.5–10.1)
CHLORIDE SERPL-SCNC: 98 MMOL/L (ref 97–108)
CO2 SERPL-SCNC: 28 MMOL/L (ref 21–32)
COMMENT, HOLDF: NORMAL
CREAT SERPL-MCNC: 1.16 MG/DL (ref 0.7–1.3)
GLOBULIN SER CALC-MCNC: 4.2 G/DL (ref 2–4)
GLUCOSE SERPL-MCNC: 239 MG/DL (ref 65–100)
INR PPP: 1 (ref 0.9–1.1)
POTASSIUM SERPL-SCNC: 4.2 MMOL/L (ref 3.5–5.1)
PROT SERPL-MCNC: 8.1 G/DL (ref 6.4–8.2)
PROTHROMBIN TIME: 10.6 SEC (ref 9–11.1)
SAMPLES BEING HELD,HOLD: NORMAL
SODIUM SERPL-SCNC: 136 MMOL/L (ref 136–145)
THERAPEUTIC RANGE,PTTT: NORMAL SECS (ref 58–77)

## 2018-09-18 PROCEDURE — 70450 CT HEAD/BRAIN W/O DYE: CPT

## 2018-09-18 PROCEDURE — 36415 COLL VENOUS BLD VENIPUNCTURE: CPT | Performed by: EMERGENCY MEDICINE

## 2018-09-18 PROCEDURE — 85610 PROTHROMBIN TIME: CPT | Performed by: PHYSICIAN ASSISTANT

## 2018-09-18 PROCEDURE — 99282 EMERGENCY DEPT VISIT SF MDM: CPT

## 2018-09-18 PROCEDURE — 80053 COMPREHEN METABOLIC PANEL: CPT | Performed by: PHYSICIAN ASSISTANT

## 2018-09-18 PROCEDURE — 85730 THROMBOPLASTIN TIME PARTIAL: CPT | Performed by: PHYSICIAN ASSISTANT

## 2018-09-18 PROCEDURE — 72125 CT NECK SPINE W/O DYE: CPT

## 2018-09-18 PROCEDURE — 85025 COMPLETE CBC W/AUTO DIFF WBC: CPT | Performed by: EMERGENCY MEDICINE

## 2018-09-18 NOTE — ED TRIAGE NOTES
Pt states he slipped on a wet surface last night and fell. Went to [facility] for evaluation and was told to come to the ER for further of platelet levels, states he was told his platelets are off. States his R elbow was bleeding and would not stop. Bandage on R elbow currently dry and intact. States [facility] did blood work and x-rays last night but does continue with pain to R elbow, posterior head, buttocks an R shoulder.

## 2018-09-18 NOTE — ED PROVIDER NOTES
HPI Comments: 78 y.o. male with past medical history significant for HTN, DM, and dyslipidemia presents as a referral from [facility] d/t thrombocytopenia. The pt states that he fell last night landing on his elbow and back. He states that he went to [facility] today after having negative images of T/L spine and right elbow all which were negative. [facility] cristian blood work revealed a platelet count of 60. Pt denies being on any blood thinners. Pt denies facial asymmetry, dysarthria, ataxia, unilateral weakness, or vision changes. There are no other acute medical complaints at this time. PCP: MD Taya Calvin PA-C Patient is a 78 y.o. male presenting with abnormal lab results. Abnormal Lab Results Pertinent negatives include no chest pain, no abdominal pain and no shortness of breath. Past Medical History:  
Diagnosis Date  Diabetes ([address]  Elevated cholesterol  Hypertension  Hypothyroid Past Surgical History:  
Procedure Laterality Date [address]  
 HX ORTHOPAEDIC  04/09/2018 R shoulder replacement History reviewed. No pertinent family history. Social History Social History  Marital status:  Spouse name: N/A  
 Number of children: N/A  
 Years of education: N/A Occupational History  Not on file. Social History Main Topics  Smoking status: Never Smoker  Smokeless tobacco: Never Used  Alcohol use No  
 Drug use: Not on file  Sexual activity: Not on file Other Topics Concern  Not on file Social History Narrative  No narrative on file ALLERGIES: Pcn [penicillins] Review of Systems Constitutional: Negative for activity change, appetite change, diaphoresis and fever. HENT: Negative for ear discharge, ear pain, facial swelling, rhinorrhea, sore throat, tinnitus, trouble swallowing and voice change. Eyes: Negative for photophobia, pain, discharge, redness and visual disturbance. Respiratory: Negative for cough, chest tightness, shortness of breath, wheezing and stridor. Cardiovascular: Negative for chest pain and palpitations. Gastrointestinal: Negative for abdominal pain, constipation, diarrhea, nausea and vomiting. Endocrine: Negative for polydipsia and polyuria. Genitourinary: Negative for dysuria, flank pain and hematuria. Musculoskeletal: Positive for arthralgias and joint swelling. Negative for back pain and myalgias. Skin: Negative for color change and rash. Neurological: Negative for dizziness, syncope, speech difficulty, light-headedness and numbness. Psychiatric/Behavioral: Negative for behavioral problems. Vitals:  
 09/18/18 1308 09/18/18 1620 BP: 115/76 119/77 Pulse: (!) 110 95 Resp: 18 18 Temp: 99 °F (37.2 °C) 98.5 °F (36.9 °C) SpO2: 94% 97% Weight: 91.9 kg (202 lb 9.6 oz) Height: 5' 9\" (1.753 m) Physical Exam  
Constitutional: He is oriented to person, place, and time. He appears well-developed and well-nourished. No distress. HENT:  
Head: Normocephalic and atraumatic. Eyes: Conjunctivae are normal. Pupils are equal, round, and reactive to light. Neck: Normal range of motion. Neck supple. Cardiovascular: Normal rate, regular rhythm and normal heart sounds. Pulmonary/Chest: Effort normal and breath sounds normal. No respiratory distress. He has no wheezes. Abdominal: Soft. Bowel sounds are normal. He exhibits no distension. There is no tenderness. Musculoskeletal: He exhibits edema and tenderness. Right elbow is swollen and ecchymotic. DROM at the right elbow joint. Pt is NVI. Neurological: He is alert and oriented to person, place, and time. Skin: Skin is warm. He is not diaphoretic. MDM Number of Diagnoses or Management Options Elbow swelling, unspecified laterality: Thrombocytopenia ([address]):  
 Diagnosis management comments: Head CT negative. Suspect hemarthrosis of right elbow. Advised conservative treatment only for elbow. Pt also advised to follow up with hematology for further workup for thrombocytopenia. Pt states that he took narcotic pain medicines he had left over from shoulder surgery in April. He explained that \"they didn't do anything. \"  When I advised OTC pain medicines he said \"if you aren't going to do anything for my pain I am going to the corner to get something. \"  Randal Garibay PA-C 
 
 
 
ED Course Procedures

## 2018-09-18 NOTE — DISCHARGE INSTRUCTIONS
Thrombocytopenia: Care Instructions  Your Care Instructions    Thrombocytopenia is a low number of platelets in the blood. Platelets are the cells that help blood clot. If you don't have enough of them, your blood cannot clot well. So it is harder to stop bleeding. You may have low platelets because your bone marrow does not make them. Or your body's defenses (immune system) may destroy them. Having an enlarged spleen can also reduce the number of platelets in your blood. This is because they can get trapped in the enlarged spleen. Some diseases or medicines may also cause low platelets. But platelets may go back to normal levels if the disease is treated or the medicine is stopped. You may not need treatment if your problem is mild. If you do need treatment, you may have platelets added to your blood. Or you may get medicine to stop the loss of platelets or help your body make them. Follow-up care is a key part of your treatment and safety. Be sure to make and go to all appointments, and call your doctor if you are having problems. It's also a good idea to know your test results and keep a list of the medicines you take. How can you care for yourself at home? · Be safe with medicines. Take your medicines exactly as prescribed. Call your doctor if you think you are having a problem with your medicine. · Do not take aspirin or anti-inflammatory medicines unless your doctor says it is okay. Examples are ibuprofen (Advil, Motrin) and naproxen (Aleve). They may increase the risk of bleeding. · Avoid contact sports or activities that could cause you to fall. When should you call for help? Call 911 anytime you think you may need emergency care. For example, call if:    · You passed out (lost consciousness).     · You have signs of severe bleeding, which includes:  ¨ You have a severe headache that is different from past headaches. ¨ You vomit blood or what looks like coffee grounds.   ¨ Your stools are maroon or very bloody.    Call your doctor now or seek immediate medical care if:    · You are dizzy or lightheaded, or you feel like you may faint.     · You have abnormal bleeding, such as:  ¨ Your stools are black and look like tar, or they have streaks of blood. ¨ You have blood in your urine. ¨ You have joint pain. ¨ You have bruises or blood spots under your skin.    Watch closely for changes in your health, and be sure to contact your doctor if:    · You do not get better as expected. Where can you learn more? Go to http://mirandaDyynoevelia.info/. Enter X528 in the search box to learn more about \"Thrombocytopenia: Care Instructions. \"  Current as of: October 9, 2017  Content Version: 11.7  © 0423-8942 [employer]. Care instructions adapted under license by [employer] (which disclaims liability or warranty for this information). If you have questions about a medical condition or this instruction, always ask your healthcare professional. [place] [address] any warranty or liability for your use of this information. We hope that we have addressed all of your medical concerns. The examination and treatment you received in the Emergency Department were for an emergent problem and were not intended as complete care. It is important that you follow up with your healthcare provider(s) for ongoing care. If your symptoms worsen or do not improve as expected, and you are unable to reach your usual health care provider(s), you should return to the Emergency Department. Today's healthcare is undergoing tremendous change, and patient satisfaction surveys are one of the many tools to assess the quality of medical care. You may receive a survey from the [employer] organization regarding your experience in the Emergency Department. I hope that your experience has been completely positive, particularly the medical care that I provided.   As such, please participate in the survey; anything less than excellent does not meet my expectations or intentions. [facility] and [facility] participate in nationally recognized quality of care measures. If your blood pressure is greater than 120/80, as reported below, we urge that you seek medical care to address the potential of high blood pressure, commonly known as hypertension. Hypertension can be hereditary or can be caused by certain medical conditions, pain, stress, or \"white coat syndrome. \"       Please make an appointment with your health care provider(s) for follow up of your Emergency Department visit. VITALS:   Patient Vitals for the past 8 hrs:   Temp Pulse Resp BP SpO2   09/18/18 1308 99 °F (37.2 °C) (!) 110 18 115/76 94 %          Thank you for allowing us to provide you with medical care today. We realize that you have many choices for your emergency care needs. Please choose us in the future for any continued health care needs. Wesley Shoaib [address].   Office: [phone]            Recent Results (from the past 24 hour(s))   CBC WITH AUTOMATED DIFF    Collection Time: 09/18/18  1:21 PM   Result Value Ref Range    WBC 8.3 4.1 - 11.1 K/uL    RBC 4.40 4. 10 - 5.70 M/uL    HGB 13.9 12.1 - 17.0 g/dL    HCT 40.7 36.6 - 50.3 %    MCV 92.5 80.0 - 99.0 FL    MCH 31.6 26.0 - 34.0 PG    MCHC 34.2 30.0 - 36.5 g/dL    RDW 12.9 11.5 - 14.5 %    PLATELET 43 (LL) 372 - 400 K/uL    MPV 12.4 8.9 - 12.9 FL    NRBC 0.0 0  WBC    ABSOLUTE NRBC 0.00 0.00 - 0.01 K/uL    NEUTROPHILS 81 (H) 32 - 75 %    LYMPHOCYTES 8 (L) 12 - 49 %    MONOCYTES 9 5 - 13 %    EOSINOPHILS 0 0 - 7 %    BASOPHILS 2 (H) 0 - 1 %    IMMATURE GRANULOCYTES 0 %    ABS. NEUTROPHILS 6.7 1.8 - 8.0 K/UL    ABS. LYMPHOCYTES 0.7 (L) 0.8 - 3.5 K/UL    ABS. MONOCYTES 0.7 0.0 - 1.0 K/UL    ABS. EOSINOPHILS 0.0 0.0 - 0.4 K/UL    ABS.  BASOPHILS 0.2 (H) 0.0 - 0.1 K/UL    ABS. IMM. GRANS. 0.0 K/UL    DF MANUAL      PLATELET COMMENTS Pathology Review Requested      RBC COMMENTS NORMOCYTIC, NORMOCHROMIC     SAMPLES BEING HELD    Collection Time: 09/18/18  1:21 PM   Result Value Ref Range    SAMPLES BEING HELD 1RED,1PST,1BLUE     COMMENT        Add-on orders for these samples will be processed based on acceptable specimen integrity and analyte stability, which may vary by analyte. PTT    Collection Time: 09/18/18  1:21 PM   Result Value Ref Range    aPTT 24.7 22.1 - 32.0 sec    aPTT, therapeutic range     58.0 - 77.0 SECS   PROTHROMBIN TIME + INR    Collection Time: 09/18/18  1:21 PM   Result Value Ref Range    INR 1.0 0.9 - 1.1      Prothrombin time 10.6 9.0 - 09.8 sec   METABOLIC PANEL, COMPREHENSIVE    Collection Time: 09/18/18  1:21 PM   Result Value Ref Range    Sodium 136 136 - 145 mmol/L    Potassium 4.2 3.5 - 5.1 mmol/L    Chloride 98 97 - 108 mmol/L    CO2 28 21 - 32 mmol/L    Anion gap 10 5 - 15 mmol/L    Glucose 239 (H) 65 - 100 mg/dL    BUN 22 (H) 6 - 20 MG/DL    Creatinine 1.16 0.70 - 1.30 MG/DL    BUN/Creatinine ratio 19 12 - 20      GFR est AA >60 >60 ml/min/1.73m2    GFR est non-AA >60 >60 ml/min/1.73m2    Calcium 9.1 8.5 - 10.1 MG/DL    Bilirubin, total 0.4 0.2 - 1.0 MG/DL    ALT (SGPT) 48 12 - 78 U/L    AST (SGOT) 24 15 - 37 U/L    Alk. phosphatase 47 45 - 117 U/L    Protein, total 8.1 6.4 - 8.2 g/dL    Albumin 3.9 3.5 - 5.0 g/dL    Globulin 4.2 (H) 2.0 - 4.0 g/dL    A-G Ratio 0.9 (L) 1.1 - 2.2         Ct Head Wo Cont    Result Date: 9/18/2018  EXAM:  CT HEAD WO CONT INDICATION:   Ground-level fall. COMPARISON: None. TECHNIQUE: Unenhanced CT of the head was performed using 5 mm images. Brain and bone windows were generated. CT dose reduction was achieved through use of a standardized protocol tailored for this examination and automatic exposure control for dose modulation. FINDINGS: The ventricles are normal in size and position.  Basilar cisterns are patent. No midline shift. There is no evidence of acute infarct, hemorrhage, or extraaxial fluid collection. The paranasal sinuses, mastoid air cells, and middle ears are clear. The orbital contents are within normal limits. Small midline parieto-occipital scalp hematoma. IMPRESSION: 1. No evidence of acute intracranial abnormality. 2. Small midline parieto-occipital scalp hematoma. Ct Spine Cerv Wo Cont    Result Date: 9/18/2018  EXAM:  CT SPINE CERV WO CONT INDICATION:  Ground-level fall. COMPARISON: None. TECHNIQUE:   Unenhanced multislice helical CT of the cervical spine was performed in the axial plane. Coronal and sagittal reconstructions were obtained. CT dose reduction was achieved through use of a standardized protocol tailored for this examination and automatic exposure control for dose modulation. FINDINGS: Mild levocurvature of the cervical spine. Vertebral body heights are preserved without evidence of acute fracture. Degenerative disc disease at C4-C5 and C5-C6 with prominent ventral osteophytes. No significant spinal canal stenosis at any level. Visualized soft tissues of the neck are unremarkable. Visualized lung apices are clear without evidence of pneumothorax. An azygos fissure is noted. IMPRESSION:   No evidence of acute fracture.

## 2018-09-18 NOTE — ED NOTES
Discharge instructions given to pt by RN in teach back method and verbalizes understanding. Opportunity for questions provided. Pt ambulatory out of unit, in no acute distress and taken home by self.

## 2018-09-19 LAB
BASOPHILS # BLD: 0.2 K/UL (ref 0–0.1)
BASOPHILS NFR BLD: 2 % (ref 0–1)
DIFFERENTIAL METHOD BLD: ABNORMAL
EOSINOPHIL # BLD: 0 K/UL (ref 0–0.4)
EOSINOPHIL NFR BLD: 0 % (ref 0–7)
ERYTHROCYTE [DISTWIDTH] IN BLOOD BY AUTOMATED COUNT: 12.9 % (ref 11.5–14.5)
HCT VFR BLD AUTO: 40.7 % (ref 36.6–50.3)
HGB BLD-MCNC: 13.9 G/DL (ref 12.1–17)
IMM GRANULOCYTES # BLD: 0 K/UL
IMM GRANULOCYTES NFR BLD AUTO: 0 %
LYMPHOCYTES # BLD: 0.7 K/UL (ref 0.8–3.5)
LYMPHOCYTES NFR BLD: 8 % (ref 12–49)
MCH RBC QN AUTO: 31.6 PG (ref 26–34)
MCHC RBC AUTO-ENTMCNC: 34.2 G/DL (ref 30–36.5)
MCV RBC AUTO: 92.5 FL (ref 80–99)
MONOCYTES # BLD: 0.7 K/UL (ref 0–1)
MONOCYTES NFR BLD: 9 % (ref 5–13)
NEUTS SEG # BLD: 6.7 K/UL (ref 1.8–8)
NEUTS SEG NFR BLD: 81 % (ref 32–75)
NRBC # BLD: 0 K/UL (ref 0–0.01)
NRBC BLD-RTO: 0 PER 100 WBC
PATH REV BLD -IMP: ABNORMAL
PLATELET # BLD AUTO: 43 K/UL (ref 150–400)
PLATELET COMMENTS,PCOM: ABNORMAL
PMV BLD AUTO: 12.4 FL (ref 8.9–12.9)
RBC # BLD AUTO: 4.4 M/UL (ref 4.1–5.7)
RBC MORPH BLD: ABNORMAL
WBC # BLD AUTO: 8.3 K/UL (ref 4.1–11.1)

## 2018-09-19 NOTE — CALL BACK NOTE
[facility] Emergency Department Follow Up Call Record Discharged to : Home/Family Home/Home Health/Skilled Facility/Rehab/Assisted Living/Other__Home_____ 1) Did you receive your discharge instructions? YES        
2) Do you understand them? Discussed Discharge Instructions at length with this patient and reasons to follow up with  Hematologist       
3) Are you able to follow them? At this time patient stating he will not pursue any follow up. If NO, what can I clarify for you? Need to follow up with Hematologtist or refer to your PCP. 4) Do you understand your diagnosis? Discussed diagnosis with this patient 5) Do you know which symptoms should prompt you to call the doctor? YES. But patient  will not call a PCP. 6) Were you able to fill and  any medications that were prescribed? Not applicable 7) You were prescribed _none__________for ____________________. Common side effects of this medication are____________________. This is not a complete list so please review the forms given from the pharmacy for a complete list. 8) Are there any questions about your medications? NO Have you scheduled any recommended doctors appointments (specialty, PCP)NO If NO, what barriers are you encountering (transportation/lost contact info/cost/ 
didnt think necessary/no PCP    Personal barriers, \"Im tired of doctors\". 9) If discharged with Home Health, has the agency contacted you to schedule visit? Not applicable   Is there anyone available to help you at home (meals, errands, transportation   
monitoring) (adult children, neighbors, private duty companions)  YES   
10) Are you on a special diet? DM If YES, do you understand the requirements for this diet? Education provided? 11) If presented with cough, bronchitis, COPD, asthma, is it ok to ask that the 
 respiratory disease management educator call you?  N/A    
 12)  A) If presented with fall, were you issued an assistive device in the ED Are you using? NO B) If given RX for device, have you obtained? N/A If NO, barriers? C) Therapist recommended: NO Are you able to implement the suggestions? If NO, barriers to implementation? D) Are you having any difficulties with mobility inside your home?   
 (steps, bed, tub)   No mobilty problems reported If YES, ask if the [facility] PT can contact patient and good time and number? 
13)  At the end of your discharge instructions, there is information about accessing [facility] SERVICES, have you had a chance to review those? NO Do you have any questions about signing up for this service? NO We encourage our patients to be active participants in their healthcare and this site is one of the ways to do that. It will allow you to access parts of your medical record, email your doctors office, schedule appointments, and request medications refills . 14) Are there any other questions that I can answer for you regarding  
 your Emergency department visit? Patient complaint. .. that dressing on his elbow was not    Not changed, nor elbow abrasion evaluated in ED. Patient states elbow abrasion still bleeding. Estimated Call Time:______1:42 PM 
_____________ Date/Time:_______________

## 2019-02-21 ENCOUNTER — HOSPITAL ENCOUNTER (INPATIENT)
Age: 69
LOS: 4 days | Discharge: HOME | DRG: 813 | End: 2019-02-25
Attending: EMERGENCY MEDICINE | Admitting: INTERNAL MEDICINE
Payer: MEDICARE

## 2019-02-21 DIAGNOSIS — D69.6 THROMBOCYTOPENIA (HCC): Primary | ICD-10-CM

## 2019-02-21 PROBLEM — G89.29 CHRONIC LEFT SHOULDER PAIN: Status: ACTIVE | Noted: 2019-02-21

## 2019-02-21 PROBLEM — M25.512 CHRONIC LEFT SHOULDER PAIN: Status: ACTIVE | Noted: 2019-02-21

## 2019-02-21 PROBLEM — E11.9 DIABETES MELLITUS TYPE 2, CONTROLLED (HCC): Status: ACTIVE | Noted: 2019-02-21

## 2019-02-21 PROBLEM — D69.3 ACUTE ITP (HCC): Status: ACTIVE | Noted: 2019-02-21

## 2019-02-21 LAB
ABO + RH BLD: NORMAL
ALBUMIN SERPL-MCNC: 3.8 G/DL (ref 3.5–5)
ALBUMIN/GLOB SERPL: 1 {RATIO} (ref 1.1–2.2)
ALP SERPL-CCNC: 53 U/L (ref 45–117)
ALT SERPL-CCNC: 36 U/L (ref 12–78)
ANION GAP SERPL CALC-SCNC: 8 MMOL/L (ref 5–15)
APTT PPP: 25.3 SEC (ref 22.1–32)
AST SERPL-CCNC: 23 U/L (ref 15–37)
BILIRUB SERPL-MCNC: 0.5 MG/DL (ref 0.2–1)
BLOOD GROUP ANTIBODIES SERPL: NORMAL
BUN SERPL-MCNC: 21 MG/DL (ref 6–20)
BUN/CREAT SERPL: 17 (ref 12–20)
CALCIUM SERPL-MCNC: 8.8 MG/DL (ref 8.5–10.1)
CHLORIDE SERPL-SCNC: 104 MMOL/L (ref 97–108)
CO2 SERPL-SCNC: 26 MMOL/L (ref 21–32)
COMMENT, HOLDF: NORMAL
CREAT SERPL-MCNC: 1.25 MG/DL (ref 0.7–1.3)
FIBRINOGEN PPP-MCNC: 348 MG/DL (ref 200–475)
GLOBULIN SER CALC-MCNC: 3.7 G/DL (ref 2–4)
GLUCOSE BLD STRIP.AUTO-MCNC: 185 MG/DL (ref 65–100)
GLUCOSE SERPL-MCNC: 222 MG/DL (ref 65–100)
HIV 1+2 AB+HIV1 P24 AG SERPL QL IA: NONREACTIVE
HIV12 RESULT COMMENT, HHIVC: NORMAL
INR PPP: 1 (ref 0.9–1.1)
POTASSIUM SERPL-SCNC: 3.7 MMOL/L (ref 3.5–5.1)
PROT SERPL-MCNC: 7.5 G/DL (ref 6.4–8.2)
PROTHROMBIN TIME: 10 SEC (ref 9–11.1)
SAMPLES BEING HELD,HOLD: NORMAL
SERVICE CMNT-IMP: ABNORMAL
SODIUM SERPL-SCNC: 138 MMOL/L (ref 136–145)
SPECIMEN EXP DATE BLD: NORMAL
THERAPEUTIC RANGE,PTTT: NORMAL SECS (ref 58–77)

## 2019-02-21 PROCEDURE — 85610 PROTHROMBIN TIME: CPT

## 2019-02-21 PROCEDURE — 65270000029 HC RM PRIVATE

## 2019-02-21 PROCEDURE — 82784 ASSAY IGA/IGD/IGG/IGM EACH: CPT

## 2019-02-21 PROCEDURE — 86038 ANTINUCLEAR ANTIBODIES: CPT

## 2019-02-21 PROCEDURE — 99283 EMERGENCY DEPT VISIT LOW MDM: CPT

## 2019-02-21 PROCEDURE — 80053 COMPREHEN METABOLIC PANEL: CPT

## 2019-02-21 PROCEDURE — 74011250637 HC RX REV CODE- 250/637: Performed by: INTERNAL MEDICINE

## 2019-02-21 PROCEDURE — 87389 HIV-1 AG W/HIV-1&-2 AB AG IA: CPT

## 2019-02-21 PROCEDURE — P9035 PLATELET PHERES LEUKOREDUCED: HCPCS

## 2019-02-21 PROCEDURE — 30233R1 TRANSFUSION OF NONAUTOLOGOUS PLATELETS INTO PERIPHERAL VEIN, PERCUTANEOUS APPROACH: ICD-10-PCS | Performed by: INTERNAL MEDICINE

## 2019-02-21 PROCEDURE — 36415 COLL VENOUS BLD VENIPUNCTURE: CPT

## 2019-02-21 PROCEDURE — 74011636637 HC RX REV CODE- 636/637: Performed by: INTERNAL MEDICINE

## 2019-02-21 PROCEDURE — 86677 HELICOBACTER PYLORI ANTIBODY: CPT

## 2019-02-21 PROCEDURE — 85025 COMPLETE CBC W/AUTO DIFF WBC: CPT

## 2019-02-21 PROCEDURE — 85730 THROMBOPLASTIN TIME PARTIAL: CPT

## 2019-02-21 PROCEDURE — 85384 FIBRINOGEN ACTIVITY: CPT

## 2019-02-21 PROCEDURE — 82962 GLUCOSE BLOOD TEST: CPT

## 2019-02-21 PROCEDURE — 74011250636 HC RX REV CODE- 250/636: Performed by: INTERNAL MEDICINE

## 2019-02-21 PROCEDURE — 86900 BLOOD TYPING SEROLOGIC ABO: CPT

## 2019-02-21 PROCEDURE — 87521 HEPATITIS C PROBE&RVRS TRNSC: CPT

## 2019-02-21 PROCEDURE — 36430 TRANSFUSION BLD/BLD COMPNT: CPT

## 2019-02-21 RX ORDER — LEVOTHYROXINE SODIUM 175 UG/1
175 TABLET ORAL
COMMUNITY

## 2019-02-21 RX ORDER — SODIUM CHLORIDE 0.9 % (FLUSH) 0.9 %
5-40 SYRINGE (ML) INJECTION AS NEEDED
Status: DISCONTINUED | OUTPATIENT
Start: 2019-02-21 | End: 2019-02-25 | Stop reason: HOSPADM

## 2019-02-21 RX ORDER — DEXTROSE 50 % IN WATER (D50W) INTRAVENOUS SYRINGE
25-50 AS NEEDED
Status: DISCONTINUED | OUTPATIENT
Start: 2019-02-21 | End: 2019-02-25 | Stop reason: HOSPADM

## 2019-02-21 RX ORDER — ONDANSETRON 2 MG/ML
4 INJECTION INTRAMUSCULAR; INTRAVENOUS
Status: DISCONTINUED | OUTPATIENT
Start: 2019-02-21 | End: 2019-02-25 | Stop reason: HOSPADM

## 2019-02-21 RX ORDER — SODIUM CHLORIDE 0.9 % (FLUSH) 0.9 %
5-40 SYRINGE (ML) INJECTION EVERY 8 HOURS
Status: DISCONTINUED | OUTPATIENT
Start: 2019-02-21 | End: 2019-02-25 | Stop reason: HOSPADM

## 2019-02-21 RX ORDER — DEXTROAMPHETAMINE SACCHARATE, AMPHETAMINE ASPARTATE, DEXTROAMPHETAMINE SULFATE AND AMPHETAMINE SULFATE 7.5; 7.5; 7.5; 7.5 MG/1; MG/1; MG/1; MG/1
30 TABLET ORAL 2 TIMES DAILY
COMMUNITY

## 2019-02-21 RX ORDER — SODIUM CHLORIDE 9 MG/ML
250 INJECTION, SOLUTION INTRAVENOUS AS NEEDED
Status: DISCONTINUED | OUTPATIENT
Start: 2019-02-21 | End: 2019-02-25 | Stop reason: HOSPADM

## 2019-02-21 RX ORDER — HYDROCODONE BITARTRATE AND ACETAMINOPHEN 5; 325 MG/1; MG/1
1 TABLET ORAL
Status: DISCONTINUED | OUTPATIENT
Start: 2019-02-21 | End: 2019-02-25 | Stop reason: HOSPADM

## 2019-02-21 RX ORDER — INSULIN GLARGINE 100 [IU]/ML
10 INJECTION, SOLUTION SUBCUTANEOUS
Status: DISCONTINUED | OUTPATIENT
Start: 2019-02-21 | End: 2019-02-22

## 2019-02-21 RX ORDER — INSULIN LISPRO 100 [IU]/ML
INJECTION, SOLUTION INTRAVENOUS; SUBCUTANEOUS
Status: DISCONTINUED | OUTPATIENT
Start: 2019-02-21 | End: 2019-02-22

## 2019-02-21 RX ORDER — ACETAMINOPHEN 325 MG/1
650 TABLET ORAL ONCE
Status: COMPLETED | OUTPATIENT
Start: 2019-02-22 | End: 2019-02-21

## 2019-02-21 RX ORDER — ATORVASTATIN CALCIUM 20 MG/1
20 TABLET, FILM COATED ORAL DAILY
COMMUNITY

## 2019-02-21 RX ORDER — MAGNESIUM SULFATE 100 %
4 CRYSTALS MISCELLANEOUS AS NEEDED
Status: DISCONTINUED | OUTPATIENT
Start: 2019-02-21 | End: 2019-02-25 | Stop reason: HOSPADM

## 2019-02-21 RX ORDER — DEXTROAMPHETAMINE SACCHARATE, AMPHETAMINE ASPARTATE, DEXTROAMPHETAMINE SULFATE AND AMPHETAMINE SULFATE 2.5; 2.5; 2.5; 2.5 MG/1; MG/1; MG/1; MG/1
30 TABLET ORAL 2 TIMES DAILY
Status: DISCONTINUED | OUTPATIENT
Start: 2019-02-22 | End: 2019-02-25 | Stop reason: HOSPADM

## 2019-02-21 RX ORDER — DULOXETIN HYDROCHLORIDE 30 MG/1
30 CAPSULE, DELAYED RELEASE ORAL DAILY
COMMUNITY

## 2019-02-21 RX ORDER — MELATONIN 5 MG
10 CAPSULE ORAL
COMMUNITY

## 2019-02-21 RX ORDER — LISINOPRIL AND HYDROCHLOROTHIAZIDE 20; 25 MG/1; MG/1
1 TABLET ORAL DAILY
COMMUNITY

## 2019-02-21 RX ORDER — METFORMIN HYDROCHLORIDE 500 MG/1
500 TABLET, FILM COATED, EXTENDED RELEASE ORAL 3 TIMES DAILY
COMMUNITY

## 2019-02-21 RX ADMIN — ACETAMINOPHEN 650 MG: 325 TABLET ORAL at 23:29

## 2019-02-21 RX ADMIN — METHYLPREDNISOLONE SODIUM SUCCINATE 125 MG: 125 INJECTION, POWDER, FOR SOLUTION INTRAMUSCULAR; INTRAVENOUS at 23:29

## 2019-02-21 RX ADMIN — INSULIN GLARGINE 10 UNITS: 100 INJECTION, SOLUTION SUBCUTANEOUS at 23:19

## 2019-02-21 RX ADMIN — HYDROCODONE BITARTRATE AND ACETAMINOPHEN 1 TABLET: 5; 325 TABLET ORAL at 23:00

## 2019-02-21 NOTE — PROGRESS NOTES
Admission Medication Reconciliation: 
 
Information obtained from:  Patient/RxQuery Comments/Recommendations: Updated PTA meds/reviewed patient's allergies. 1)  Patient was able to participate in obtaining PTA med list. 2)  Medication changes (since last review): Added - Atorvastatin 20 mg; 1 tablet daily - Adderal 20 m tablet twice a day - Duloxetine 30 m tablet daily ------> pt stated he hasn't taken for a week due to diarrhea. - Levothyroxine 175 mc tablet daily - Lisinopril/HCTZ 20-25 m tablet daily - Metformin  m tablet 3 times a day - One a day multivitamin: 1 tablet daily Adjusted - N/A Removed - N/A Allergies:  Pcn [penicillins] Significant PMH/Disease States:  
Past Medical History:  
Diagnosis Date  Diabetes ([facility] [address]  Elevated cholesterol  Hypertension  Hypothyroid Chief Complaint for this Admission: Chief Complaint Patient presents with  Abnormal Lab Results Prior to Admission Medications:  
Prior to Admission Medications Prescriptions Last Dose Informant Patient Reported? Taking? DULoxetine (CYMBALTA) 30 mg capsule 2019 at Unknown time Self Yes Yes Sig: Take 30 mg by mouth daily. atorvastatin (LIPITOR) 20 mg tablet  Self Yes Yes Sig: Take 20 mg by mouth daily. dextroamphetamine-amphetamine (ADDERALL) 30 mg tablet  Self Yes Yes Sig: Take 30 mg by mouth two (2) times a day. levothyroxine (SYNTHROID) 175 mcg tablet  Self Yes Yes Sig: Take 175 mcg by mouth Daily (before breakfast). lisinopril-hydroCHLOROthiazide (PRINZIDE, ZESTORETIC) 20-25 mg per tablet 2019 at AM Self Yes Yes Sig: Take 1 Tab by mouth daily. [facility]) 500 mg TG24 24 hour tablet 2019 at AM Self Yes Yes Sig: Take 500 mg by mouth three (3) times daily. multivitamin, tx-iron-ca-min (THERA-M W/ IRON) 9 mg iron-400 mcg tab tablet  Self Yes Yes Sig: Take 1 Tab by mouth daily. Facility-Administered Medications: None Thank you for allowing pharmacy to participate in the coordination of this patient's care. If you have any other questions, please contact the medication reconciliation pharmacist at [phone]. Arslan Solis 2019

## 2019-02-21 NOTE — H&P
History and Physical 
 
Primary Care Provider: Tim Narayanan MD 
 
Subjective:  
 
CC: Thrombocytopenia- Lab abnormality Macarena De Jesus is a 80 y.o. male with PMH of DM2, Hypothyroidism. Noticed swelling/dark looking on side of gum yesterday, also few petechiae, this morning woke noticed tongue bruise, and few more spots on his skin, went to urgent care, got labs done, they called him with results and advised to attend ED since his Platelets noted to be very low, in ED plt 5. Otherwise CBC ok. Denies fever/chills, recent illnesses, no new drugs/chemicals, no exposures to new agents, no travel outside recently. No prev similar hx. No noted blood per rectum, hematuria, or mouth bleed, did have some epistaxis though. Chronic sig pain in R shoulder 's/p surgery 1year back'. No other complaints Review of Systems: Further 10 point review of systems is benign. A comprehensive review of systems was negative except for that written in the History of Present Illness. Past Medical History:  
Diagnosis Date  Diabetes ([facility] [address]  Elevated cholesterol  Hypertension  Hypothyroid Past Surgical History:  
Procedure Laterality Date [address]  
 HX ORTHOPAEDIC  04/09/2018 R shoulder replacement Prior to Admission medications Medication Sig Start Date End Date Taking? Authorizing Provider  
atorvastatin (LIPITOR) 20 mg tablet Take 20 mg by mouth daily. Yes Provider, Historical  
dextroamphetamine-amphetamine (ADDERALL) 30 mg tablet Take 30 mg by mouth two (2) times a day. Yes Provider, Historical  
DULoxetine (CYMBALTA) 30 mg capsule Take 30 mg by mouth daily. Yes Provider, Historical  
levothyroxine (SYNTHROID) 175 mcg tablet Take 175 mcg by mouth Daily (before breakfast). Yes Provider, Historical  
lisinopril-hydroCHLOROthiazide (PRINZIDE, ZESTORETIC) 20-25 mg per tablet Take 1 Tab by mouth daily.    Yes Provider, Historical  
 metFORMIN (GLUMETZA ER) 500 mg TG24 24 hour tablet Take 500 mg by mouth three (3) times daily. Yes Provider, Historical  
multivitamin, tx-iron-ca-min (THERA-M W/ IRON) 9 mg iron-400 mcg tab tablet Take 1 Tab by mouth daily. Yes Provider, Historical  
 
Allergies Allergen Reactions  Pcn [Penicillins] Hives History reviewed. No pertinent family history. SOCIAL HISTORY: 
Patient resides at Home. Patient ambulates with walking. Smoking history: -ve Alcohol history: occasionally Objective:  
 
Physical Exam:  
General:  Alert, oriented, No acute distress, WM 
HEENT:  Pink conjunctivae, PERRL, hearing intact to voice, MM moist, few ecchymosis /bruises on inside gum/and on tongue Neck:  Supple, without masses, thyroid non-tender Card:  S1, S2 without murmurs, good peripheral perfusion, Resp:  No accessory muscle use, clear breath sounds without wheezes or rhonchi Abd:  Soft, non-tender, non-distended, BS+, no masses, obese Lymph:  No cervical or inguinal adenopathy Extremities:  No cyanosis or clubbing, no significant edema Skin:  Multiple skin small ecchymosis, wide spread petechial rash on arms/legs, torso, skin turgor is good Neuro:  Grossly normal, no focal neuro deficits, CNs intact, follows commands Psych:  Good insight, oriented to person, place and time. ECG:  I personally reviewed: pending Data Review: All diagnostic labs and studies have been reviewed by myself personally. Imaging I personally reviewed: none done Assessment:  
 
Principal Problem: 
  Acute ITP ([address] (2/21/2019) Active Problems: Thrombocytopenia ([address] (2/21/2019) Diabetes mellitus type 2, controlled ([address] (2/21/2019) Chronic left shoulder pain (2/21/2019) Plan: #. Thrombocytopenia: acute, severe, most likely ITP 
- Admit to medical floor. Transfuse platelets, Hematology 'Dr Nakia Cardona' already reviewed patient in ED.  Will start steroids, and IVIG, will also run some blood work to rule out other possible causes, though unlikely. - Monitor platelet count closely. Monitor for any signs of bleeding, advised to take it easy and avoid unnecessary traumas. #. DM2: uncontrolled on admission with hyperglycemia: will be getting steroids - started on correctional, lantus, acuChecks, check A1c. Monitor and adjust insulin #. Hypothyroidism: home dose of levothyroxine. Check TSH #. HTN: home regimen, monitor, prn antihypertensives. #. Chronic R shoulder pain, s/p surgery last year. Op f/u with ortho, analgesia PRN 
 
DVT px: SCDs Code status: Full Dispo: Home Signed By: Sachin Richmond MD   
 February 21, 2019

## 2019-02-21 NOTE — ED PROVIDER NOTES
80 y.o. male with past medical history significant for HTN, DM, hypothyroid, and hypercholesterolemia who presents from private vehicle with chief complaint of abnormal lab results. Pt reports he was evaluated at Urgent Care earlier today. Pt had a platelet count of 0. Pt also c/o rash to his bilateral arms, legs and discoloration of his tongue, which he noticed this morning. Pt reports diarrhea for 1 week. Pt's significant other reports recent nose bleeds. Pt notes he recently received a flu vaccination. Pt states he had blood in his stool in July 2018. Pt associates his previous episodes of bloody stool with taking 3 medications at that time, which he discontinued. Pt reports right arm pain since his right shoulder replacement on 04/09/18. Pt denies hematuria or blood in stool. There are no other acute medical concerns at this time. PCP: Sherice Huertas MD 
 
Old chart review: Pt's last ED visit was on 09/18/18. Pt had a platelet count of 43. Note written by Sam Velasquez, as dictated by Kaitlyn Tovar DO 4:22 PM 
 
 
The history is provided by the patient and a significant other. Past Medical History:  
Diagnosis Date  Diabetes ([facility] [address]  Elevated cholesterol  Hypertension  Hypothyroid Past Surgical History:  
Procedure Laterality Date [address]  
 HX ORTHOPAEDIC  04/09/2018 R shoulder replacement History reviewed. No pertinent family history. Social History Socioeconomic History  Marital status:  Spouse name: Not on file  Number of children: Not on file  Years of education: Not on file  Highest education level: Not on file Social Needs  Financial resource strain: Not on file  Food insecurity - worry: Not on file  Food insecurity - inability: Not on file  Transportation needs - medical: Not on file  Transportation needs - non-medical: Not on file Occupational History  Not on file Tobacco Use  Smoking status: Never Smoker  Smokeless tobacco: Never Used Substance and Sexual Activity  Alcohol use: No  
 Drug use: Not on file  Sexual activity: Not on file Other Topics Concern  Not on file Social History Narrative  Not on file ALLERGIES: Pcn [penicillins] Review of Systems HENT: Positive for nosebleeds. Gastrointestinal: Positive for diarrhea. Negative for blood in stool. Genitourinary: Negative for hematuria. Skin: Positive for color change. All other systems reviewed and are negative. Vitals:  
 02/21/19 1508 BP: 146/90 Pulse: 95 Resp: 16 Temp: 98.3 °F (36.8 °C) SpO2: 96% Height: 5' 9\" (1.753 m) Physical Exam  
Constitutional: He appears well-developed and well-nourished. No distress. HENT:  
Head: Normocephalic and atraumatic. Left Ear: External ear normal.  
Nose: Nose normal.  
Mouth/Throat: Oropharynx is clear and moist. No oropharyngeal exudate. Areas of bleeding under tongue, cheek, and gums. Eyes: Conjunctivae and EOM are normal. Pupils are equal, round, and reactive to light. Right eye exhibits no discharge. Left eye exhibits no discharge. No scleral icterus. Neck: Normal range of motion. No tracheal deviation present. Cardiovascular: Normal rate, regular rhythm, normal heart sounds and intact distal pulses. Pulmonary/Chest: Effort normal and breath sounds normal. No stridor. He has no wheezes. He has no rales. Abdominal: Soft. He exhibits no distension and no mass. There is no tenderness. There is no rebound and no guarding. Musculoskeletal: He exhibits no edema or tenderness. Lymphadenopathy:  
  He has no cervical adenopathy. Neurological: He is alert. Skin: Skin is warm and dry. Petechiae noted. He is not diaphoretic. Petechiae to the bilateral lower extremities. Petechiae to the right forearm, left hand, and left arm. Psychiatric: He has a normal mood and affect. Nursing note and vitals reviewed. Note written by Sam Flores, as dictated by Gautam Paniagua DO 4:22 PM 
 
 
MDM Number of Diagnoses or Management Options Thrombocytopenia [facility]):  
Critical Care Total time providing critical care: 30-74 minutes 30 minutes Procedures Hospitalist Haresh for Admission 4:26 PM 
 
ED Room Number: OQ65/08 Patient Name and age:  Elsa Arellano 80 y.o.  male Working Diagnosis:  
1. Thrombocytopenia ([facility] [address] Readmission: no 
Isolation Requirements:  no 
Recommended Level of Care:  med/surg Code Status:  full Other:  plt count 5. Paging heme 
  
 
 
 
plt = 5 Seen by hospitalist and hematology

## 2019-02-21 NOTE — PROGRESS NOTES
80 y.o. WM referred 2/21/2019 for evaluation of thrombocytopenia. HPI: 
No history of blood disorders. He has never been told he had low blood counts. (We note PLT count = 43k previously in [facility] records) He has noticed some \"rash\" and tongue blistering for several days. He preseted to [facility] where he had noticeable petechia. Laboratory testing  showed a PLT count = He was referred to [facility] ED. PLT count =5K 
 
ROS: 
no recent travel/drug/tick/medication/ETOH//fever He has had loose stools but not bloody stools. Has had some mild nosebleeds Social History  
  
     
Socioeconomic History  Marital status:   
    Spouse name: Not on file  Number of children: Not on file  Years of education: Not on file  Highest education level: Not on file Social Needs  Financial resource strain: Not on file  Food insecurity - worry: Not on file  Food insecurity - inability: Not on file  Transportation needs - medical: Not on file  Transportation needs - non-medical: Not on file Occupational History  Not on file Tobacco Use  Smoking status: Never Smoker  Smokeless tobacco: Never Used Substance and Sexual Activity  Alcohol use: No  
 Drug use: Not on file  Sexual activity: Not on file Other Topics Concern  Not on file Social History Narrative  Not on file  
  
  
  
ALLERGIES: Pcn [penicillins] 
  
VSS 
NAD No oral nasla bleeding No adenopathy Lung clear Cor RRR Abd soft no palpable liver/spleen Ext no edema Skin diffuse petechia Neuro Non focal 
 
Lab: 
Peripheral blood film 
-No microangiopathic changes 
-No immature WBC 
-No PLT visible 
-Moderate rouloux Dx: Thrombocytopenia 
--appears autoimmune not microangiopathic Rx: 
Glucocorticoids Sliding scale insulin IVIGG 1 gm/kg daily x 2 Transfuse PLT x 1  
R/O DIC Disc with pt: 
 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 Possibly viral triggered Will start standard therapy. Her did have low PLT in 2018 but no notes mention this and he was not informed  Of this that he can recall Toxicity of steroids reviewed. He will need sliding scale insulin Expect recovery 2-3 days.

## 2019-02-21 NOTE — ED TRIAGE NOTES
Pt c/o \"blisters\" on his tongue and on his arm, pt seen at md office and has a platlet count of 0 (zero), no obvious bleeding noted

## 2019-02-22 LAB
ANION GAP SERPL CALC-SCNC: 9 MMOL/L (ref 5–15)
BASOPHILS # BLD: 0 K/UL (ref 0–0.1)
BASOPHILS # BLD: 0 K/UL (ref 0–0.1)
BASOPHILS NFR BLD: 1 % (ref 0–1)
BASOPHILS NFR BLD: 1 % (ref 0–1)
BLD PROD TYP BPU: NORMAL
BPU ID: NORMAL
BUN SERPL-MCNC: 19 MG/DL (ref 6–20)
BUN/CREAT SERPL: 14 (ref 12–20)
CALCIUM SERPL-MCNC: 8 MG/DL (ref 8.5–10.1)
CHLORIDE SERPL-SCNC: 100 MMOL/L (ref 97–108)
CO2 SERPL-SCNC: 23 MMOL/L (ref 21–32)
COMMENT, HOLDF: NORMAL
CREAT SERPL-MCNC: 1.36 MG/DL (ref 0.7–1.3)
DIFFERENTIAL METHOD BLD: ABNORMAL
DIFFERENTIAL METHOD BLD: ABNORMAL
EOSINOPHIL # BLD: 0 K/UL (ref 0–0.4)
EOSINOPHIL # BLD: 0.2 K/UL (ref 0–0.4)
EOSINOPHIL NFR BLD: 1 % (ref 0–7)
EOSINOPHIL NFR BLD: 4 % (ref 0–7)
ERYTHROCYTE [DISTWIDTH] IN BLOOD BY AUTOMATED COUNT: 13 % (ref 11.5–14.5)
ERYTHROCYTE [DISTWIDTH] IN BLOOD BY AUTOMATED COUNT: 13.2 % (ref 11.5–14.5)
EST. AVERAGE GLUCOSE BLD GHB EST-MCNC: 163 MG/DL
GLUCOSE BLD STRIP.AUTO-MCNC: 288 MG/DL (ref 65–100)
GLUCOSE BLD STRIP.AUTO-MCNC: 294 MG/DL (ref 65–100)
GLUCOSE BLD STRIP.AUTO-MCNC: 304 MG/DL (ref 65–100)
GLUCOSE BLD STRIP.AUTO-MCNC: 333 MG/DL (ref 65–100)
GLUCOSE BLD STRIP.AUTO-MCNC: 342 MG/DL (ref 65–100)
GLUCOSE BLD STRIP.AUTO-MCNC: 385 MG/DL (ref 65–100)
GLUCOSE SERPL-MCNC: 264 MG/DL (ref 65–100)
HBA1C MFR BLD: 7.3 % (ref 4.2–6.3)
HCT VFR BLD AUTO: 36.7 % (ref 36.6–50.3)
HCT VFR BLD AUTO: 44.1 % (ref 36.6–50.3)
HGB BLD-MCNC: 12.1 G/DL (ref 12.1–17)
HGB BLD-MCNC: 14.9 G/DL (ref 12.1–17)
IMM GRANULOCYTES # BLD AUTO: 0 K/UL
IMM GRANULOCYTES # BLD AUTO: 0 K/UL (ref 0–0.04)
IMM GRANULOCYTES NFR BLD AUTO: 0 %
IMM GRANULOCYTES NFR BLD AUTO: 1 % (ref 0–0.5)
LDH SERPL L TO P-CCNC: 209 U/L (ref 85–241)
LYMPHOCYTES # BLD: 0.5 K/UL (ref 0.8–3.5)
LYMPHOCYTES # BLD: 1.1 K/UL (ref 0.8–3.5)
LYMPHOCYTES NFR BLD: 11 % (ref 12–49)
LYMPHOCYTES NFR BLD: 26 % (ref 12–49)
MCH RBC QN AUTO: 30.3 PG (ref 26–34)
MCH RBC QN AUTO: 30.7 PG (ref 26–34)
MCHC RBC AUTO-ENTMCNC: 33 G/DL (ref 30–36.5)
MCHC RBC AUTO-ENTMCNC: 33.8 G/DL (ref 30–36.5)
MCV RBC AUTO: 90.9 FL (ref 80–99)
MCV RBC AUTO: 92 FL (ref 80–99)
MONOCYTES # BLD: 0.3 K/UL (ref 0–1)
MONOCYTES # BLD: 0.5 K/UL (ref 0–1)
MONOCYTES NFR BLD: 11 % (ref 5–13)
MONOCYTES NFR BLD: 6 % (ref 5–13)
NEUTS SEG # BLD: 2.4 K/UL (ref 1.8–8)
NEUTS SEG # BLD: 3.9 K/UL (ref 1.8–8)
NEUTS SEG NFR BLD: 58 % (ref 32–75)
NEUTS SEG NFR BLD: 80 % (ref 32–75)
NRBC # BLD: 0 K/UL (ref 0–0.01)
NRBC # BLD: 0 K/UL (ref 0–0.01)
NRBC BLD-RTO: 0 PER 100 WBC
NRBC BLD-RTO: 0 PER 100 WBC
PATH REV BLD -IMP: ABNORMAL
PLATELET # BLD AUTO: 29 K/UL (ref 150–400)
PLATELET # BLD AUTO: 5 K/UL (ref 150–400)
PMV BLD AUTO: 10.6 FL (ref 8.9–12.9)
POTASSIUM SERPL-SCNC: 3.8 MMOL/L (ref 3.5–5.1)
RBC # BLD AUTO: 3.99 M/UL (ref 4.1–5.7)
RBC # BLD AUTO: 4.85 M/UL (ref 4.1–5.7)
RBC MORPH BLD: ABNORMAL
RETICS # AUTO: 0.05 M/UL (ref 0.03–0.1)
RETICS/RBC NFR AUTO: 1.3 % (ref 0.7–2.1)
SAMPLES BEING HELD,HOLD: NORMAL
SERVICE CMNT-IMP: ABNORMAL
SODIUM SERPL-SCNC: 132 MMOL/L (ref 136–145)
STATUS OF UNIT,%ST: NORMAL
TSH SERPL DL<=0.05 MIU/L-ACNC: 11 UIU/ML (ref 0.36–3.74)
UNIT DIVISION, %UDIV: 0
WBC # BLD AUTO: 4.2 K/UL (ref 4.1–11.1)
WBC # BLD AUTO: 4.7 K/UL (ref 4.1–11.1)

## 2019-02-22 PROCEDURE — 82962 GLUCOSE BLOOD TEST: CPT

## 2019-02-22 PROCEDURE — 36415 COLL VENOUS BLD VENIPUNCTURE: CPT

## 2019-02-22 PROCEDURE — 85025 COMPLETE CBC W/AUTO DIFF WBC: CPT

## 2019-02-22 PROCEDURE — 85613 RUSSELL VIPER VENOM DILUTED: CPT

## 2019-02-22 PROCEDURE — 83615 LACTATE (LD) (LDH) ENZYME: CPT

## 2019-02-22 PROCEDURE — 86157 COLD AGGLUTININ TITER: CPT

## 2019-02-22 PROCEDURE — 74011250637 HC RX REV CODE- 250/637: Performed by: INTERNAL MEDICINE

## 2019-02-22 PROCEDURE — 65270000029 HC RM PRIVATE

## 2019-02-22 PROCEDURE — 85045 AUTOMATED RETICULOCYTE COUNT: CPT

## 2019-02-22 PROCEDURE — 80048 BASIC METABOLIC PNL TOTAL CA: CPT

## 2019-02-22 PROCEDURE — 84443 ASSAY THYROID STIM HORMONE: CPT

## 2019-02-22 PROCEDURE — 74011636637 HC RX REV CODE- 636/637: Performed by: INTERNAL MEDICINE

## 2019-02-22 PROCEDURE — 74011250636 HC RX REV CODE- 250/636: Performed by: INTERNAL MEDICINE

## 2019-02-22 PROCEDURE — 83036 HEMOGLOBIN GLYCOSYLATED A1C: CPT

## 2019-02-22 RX ORDER — INSULIN GLARGINE 100 [IU]/ML
20 INJECTION, SOLUTION SUBCUTANEOUS
Status: DISCONTINUED | OUTPATIENT
Start: 2019-02-22 | End: 2019-02-25 | Stop reason: HOSPADM

## 2019-02-22 RX ORDER — INSULIN LISPRO 100 [IU]/ML
INJECTION, SOLUTION INTRAVENOUS; SUBCUTANEOUS
Status: DISCONTINUED | OUTPATIENT
Start: 2019-02-22 | End: 2019-02-25 | Stop reason: HOSPADM

## 2019-02-22 RX ORDER — DEXTROSE 50 % IN WATER (D50W) INTRAVENOUS SYRINGE
12.5-25 AS NEEDED
Status: DISCONTINUED | OUTPATIENT
Start: 2019-02-22 | End: 2019-02-25 | Stop reason: HOSPADM

## 2019-02-22 RX ORDER — INSULIN LISPRO 100 [IU]/ML
5 INJECTION, SOLUTION INTRAVENOUS; SUBCUTANEOUS
Status: DISCONTINUED | OUTPATIENT
Start: 2019-02-22 | End: 2019-02-24

## 2019-02-22 RX ORDER — LANOLIN ALCOHOL/MO/W.PET/CERES
3 CREAM (GRAM) TOPICAL
Status: DISCONTINUED | OUTPATIENT
Start: 2019-02-22 | End: 2019-02-25 | Stop reason: HOSPADM

## 2019-02-22 RX ADMIN — DEXTROAMPHETAMINE SACCHARATE, AMPHETAMINE ASPARTATE, DEXTROAMPHETAMINE SULFATE AND AMPHETAMINE SULFATE 30 MG: 2.5; 2.5; 2.5; 2.5 TABLET ORAL at 08:51

## 2019-02-22 RX ADMIN — INSULIN LISPRO 10 UNITS: 100 INJECTION, SOLUTION INTRAVENOUS; SUBCUTANEOUS at 12:33

## 2019-02-22 RX ADMIN — INSULIN LISPRO 5 UNITS: 100 INJECTION, SOLUTION INTRAVENOUS; SUBCUTANEOUS at 12:33

## 2019-02-22 RX ADMIN — INSULIN GLARGINE 20 UNITS: 100 INJECTION, SOLUTION SUBCUTANEOUS at 21:34

## 2019-02-22 RX ADMIN — Medication 10 ML: at 14:42

## 2019-02-22 RX ADMIN — INSULIN LISPRO 5 UNITS: 100 INJECTION, SOLUTION INTRAVENOUS; SUBCUTANEOUS at 16:52

## 2019-02-22 RX ADMIN — METHYLPREDNISOLONE SODIUM SUCCINATE 125 MG: 125 INJECTION, POWDER, FOR SOLUTION INTRAMUSCULAR; INTRAVENOUS at 08:52

## 2019-02-22 RX ADMIN — HYDROCODONE BITARTRATE AND ACETAMINOPHEN 1 TABLET: 5; 325 TABLET ORAL at 22:26

## 2019-02-22 RX ADMIN — IMMUNE GLOBULIN INTRAVENOUS (HUMAN) 70 G: 5 INJECTION, SOLUTION INTRAVENOUS at 23:08

## 2019-02-22 RX ADMIN — HYDROCODONE BITARTRATE AND ACETAMINOPHEN 1 TABLET: 5; 325 TABLET ORAL at 09:21

## 2019-02-22 RX ADMIN — INSULIN LISPRO 7 UNITS: 100 INJECTION, SOLUTION INTRAVENOUS; SUBCUTANEOUS at 16:52

## 2019-02-22 RX ADMIN — DIPHENHYDRAMINE HYDROCHLORIDE 5 ML: 12.5 SOLUTION ORAL at 12:32

## 2019-02-22 RX ADMIN — METHYLPREDNISOLONE SODIUM SUCCINATE 125 MG: 125 INJECTION, POWDER, FOR SOLUTION INTRAMUSCULAR; INTRAVENOUS at 22:26

## 2019-02-22 RX ADMIN — INSULIN LISPRO 4 UNITS: 100 INJECTION, SOLUTION INTRAVENOUS; SUBCUTANEOUS at 21:33

## 2019-02-22 RX ADMIN — Medication 10 ML: at 21:34

## 2019-02-22 RX ADMIN — DEXTROAMPHETAMINE SACCHARATE, AMPHETAMINE ASPARTATE, DEXTROAMPHETAMINE SULFATE AND AMPHETAMINE SULFATE 30 MG: 2.5; 2.5; 2.5; 2.5 TABLET ORAL at 17:02

## 2019-02-22 RX ADMIN — Medication 10 ML: at 08:52

## 2019-02-22 RX ADMIN — INSULIN LISPRO 7 UNITS: 100 INJECTION, SOLUTION INTRAVENOUS; SUBCUTANEOUS at 06:45

## 2019-02-22 RX ADMIN — LEVOTHYROXINE SODIUM 175 MCG: 150 TABLET ORAL at 08:52

## 2019-02-22 RX ADMIN — IMMUNE GLOBULIN INTRAVENOUS (HUMAN) 70 G: 5 INJECTION, SOLUTION INTRAVENOUS at 00:02

## 2019-02-22 RX ADMIN — LISINOPRIL: 20 TABLET ORAL at 08:52

## 2019-02-22 RX ADMIN — DIPHENHYDRAMINE HYDROCHLORIDE 5 ML: 12.5 SOLUTION ORAL at 16:22

## 2019-02-22 NOTE — PROGRESS NOTES
Hospitalist Progress Note Sandro Araiza MD 
Answering service: [phone] OR [phone] from in house phone Date of Service:  2019 NAME:  Hood Cardenas :  1950 MRN:  [id number] Admission Summary:  
68M p/w Plt of 5, diagnosed with ITP, mild petechiae/ bruises Interval history / Subjective:  
Patient seen and examined at bedside, feels well, no bleeding overnight, had loose stools, but not dark no blood noted. Had plt transfusion, plt came up to 29, on IVIG and steroids iv, sugars very high, titrating insulin Assessment & Plan: #. Acute ITP: POA 
- Transfused platelets, came up from 5 to 29. Hematology following, steroids, and IVIG. - Monitor platelet count closely. Monitor for any signs of bleeding, 
  
#. DM2: uncontrolled on admission with hyperglycemia: A1c 7.3. On iv steroids 
- resistant SSI, lantus, acuChecks, PreMeal insulin, Monitor and adjust insulin, diabetic diet.  
  
#. Hypothyroidism: TSH 11, increase levothyroxine dose #. HTN: home regimen, monitor, prn antihypertensives. #. Chronic R shoulder pain, s/p surgery last year. Op f/u with ortho, analgesia PRN 
  
DVT px: SCDs Code status: Full Care Plan discussed with: Patient/Family and Nurse Disposition: Home w/Family Hospital Problems  Never Reviewed Codes Class Noted POA Thrombocytopenia ([facility] [address] ICD-10-CM: D69.6 ICD-9-CM: 287.5  2019 Yes * (Principal) Acute ITP ([facility] [address] ICD-10-CM: D69.3 ICD-9-CM: 287.31  2019 Yes Diabetes mellitus type 2, controlled ([facility] [address] ICD-10-CM: E11.9 ICD-9-CM: 250.00  2019 Yes Chronic left shoulder pain ICD-10-CM: M25.512, G89.29 ICD-9-CM: 719.41, 338.29  2019 Yes Review of Systems:  
Pertinent items are mentioned in interval history. Vital Signs:  
 Last 24hrs VS reviewed since prior progress note. Most recent are: 
Visit Vitals /72 (BP 1 Location: Left arm, BP Patient Position: At rest) Pulse 90 Temp 98.2 °F (36.8 °C) Resp 17 Ht 5' 9\" (1.753 m) Wt 88.2 kg (194 lb 7.1 oz) SpO2 97% BMI 28.71 kg/m² Intake/Output Summary (Last 24 hours) at 2/22/2019 1040 Last data filed at 2/22/2019 7941 Gross per 24 hour Intake  Output 400 ml Net -400 ml Physical Examination:  
General:  Alert, oriented, No acute distress Extremities:  No cyanosis or clubbing, no significant edema Skin: wide spread petechiae, arms, legs, torso Psych:  Good insight, AAOx3, not agitated. Data Review:  
 Review and/or order of clinical lab test 
Review and/or order of tests in the radiology section of CPT Review and/or order of tests in the medicine section of CPT Labs:  
 
Recent Labs  
  02/22/19 
0347 02/21/19 
1529 WBC 4.7 4.2 HGB 12.1 14.9 HCT 36.7 44.1 PLT 29* 5* Recent Labs  
  02/22/19 
0347 02/21/19 
1529 * 138  
K 3.8 3.7  104 CO2 23 26 BUN 19 21* CREA 1.36* 1.25  
* 222* CA 8.0* 8.8 Recent Labs  
  02/21/19 
1529 SGOT 23 ALT 36  
AP 53 TBILI 0.5 TP 7.5 ALB 3.8 GLOB 3.7 Recent Labs  
  02/21/19 
2050 INR 1.0 PTP 10.0 APTT 25.3 No results for input(s): FE, TIBC, PSAT, FERR in the last 72 hours. No results found for: FOL, RBCF No results for input(s): PH, PCO2, PO2 in the last 72 hours. No results for input(s): CPK, CKNDX, TROIQ in the last 72 hours. No lab exists for component: CPKMB No results found for: CHOL, CHOLX, CHLST, CHOLV, HDL, LDL, LDLC, DLDLP, TGLX, TRIGL, TRIGP, CHHD, CHHDX Lab Results Component Value Date/Time  Glucose (POC) 333 (H) 02/22/2019 06:36 AM  
 Glucose (POC) 304 (H) 02/22/2019 06:31 AM  
 Glucose (POC) 342 (H) 02/22/2019 06:30 AM  
 Glucose (POC) 185 (H) 02/21/2019 10:39 PM  
 
No results found for: COLOR, APPRN, SPGRU, REFSG, FRITZ, PROTU, GLUCU, KETU, Fran Higashi, BILLY, LEUKU, GLUKE, EPSU, BACTU, WBCU, RBCU, CASTS, UCRY Medications Reviewed:  
 
Current Facility-Administered Medications Medication Dose Route Frequency  insulin glargine (LANTUS) injection 20 Units  20 Units SubCUTAneous QHS  insulin lispro (HUMALOG) injection   SubCUTAneous AC&HS  
 dextrose (D50W) injection syrg 12.5-25 g  12.5-25 g IntraVENous PRN  
 insulin lispro (HUMALOG) injection 5 Units  5 Units SubCUTAneous TIDAC  
 0.9% sodium chloride infusion 250 mL  250 mL IntraVENous PRN  
 sodium chloride (NS) flush 5-40 mL  5-40 mL IntraVENous Q8H  
 sodium chloride (NS) flush 5-40 mL  5-40 mL IntraVENous PRN  
 HYDROcodone-acetaminophen (NORCO) 5-325 mg per tablet 1 Tab  1 Tab Oral Q4H PRN  
 ondansetron (ZOFRAN) injection 4 mg  4 mg IntraVENous Q4H PRN  
 dextroamphetamine-amphetamine (ADDERALL) tablet 30 mg  30 mg Oral BID  levothyroxine (SYNTHROID) tablet 175 mcg  175 mcg Oral ACB  glucose chewable tablet 16 g  4 Tab Oral PRN  
 dextrose (D50W) injection syrg 12.5-25 g  25-50 mL IntraVENous PRN  
 glucagon (GLUCAGEN) injection 1 mg  1 mg IntraMUSCular PRN  
 methylPREDNISolone (PF) (Solu-MEDROL) injection 125 mg  125 mg IntraVENous Q12H  
 0.9% sodium chloride infusion 250 mL  250 mL IntraVENous PRN  
 lisinopril/hydroCHLOROthiazide(PRINZIDE/ZESTORETIC) 20/25 mg   Oral DAILY  immune globulin 10% (FLEBOGAMMA DIF) infusion 70 g  70 g IntraVENous Q24H  
______________________________________________________________________ EXPECTED LENGTH OF STAY: 3d 16h ACTUAL LENGTH OF STAY:          1 Amber Crowley MD

## 2019-02-22 NOTE — WOUND CARE
Wound Consult: consulted regarding oral cavity lesions; in while Dr. Sandra Mccullough. He will order mouthwash for patient. No other skin issues for wound care per patient. Will sign off. Please re-consult if any WOC needs arise.  
Frannie Ferraro RN,cWCN

## 2019-02-22 NOTE — PROGRESS NOTES
Notified Dr. Maria Luz Fernando that pt c/o difficulty sleeping, takes melatonin 10 mg QHS at home. Order received for melatonin 3 mg QHS prn.

## 2019-02-22 NOTE — ROUTINE PROCESS
TRANSFER - OUT REPORT: 
 
Verbal report given to MOJGAN Hunt (name) on Noemi Linder  being transferred to [facility] [phone] (unit) for routine progression of care Report consisted of patients Situation, Background, Assessment and  
Recommendations(SBAR). Information from the following report(s) SBAR, ED Summary, [facility] and Recent Results was reviewed with the receiving nurse. Lines:  
Peripheral IV 02/21/19 Left Antecubital (Active) Site Assessment Clean, dry, & intact 2/21/2019  3:34 PM  
Phlebitis Assessment 0 2/21/2019  3:34 PM  
Infiltration Assessment 0 2/21/2019  3:34 PM  
Dressing Status Clean, dry, & intact 2/21/2019  3:34 PM  
Dressing Type Transparent 2/21/2019  3:34 PM  
Hub Color/Line Status Pink;Flushed;Patent 2/21/2019  3:34 PM  
Action Taken Blood drawn 2/21/2019  3:34 PM  
  
 
Opportunity for questions and clarification was provided.    
 
Patient transported with:

## 2019-02-22 NOTE — DIABETES MGMT
DTC Progress Note Recommendations/ Comments:Chart reviewed on Minor Gallop for hyperglycemia with patient on Solumedrol 125 mg q 12 hours. If appropriate, please consider:] 
- addition of NPH 15 units q 12 hours (to be given with solumedrol) Message sent to Dr Mariah Torrez 
 
[facility] DM medication: Lispro correction, normal sensitivity, Lantus 10 units Patient is a 80 y.o. male with known Type 2 Diabetes on oral agent (monotherapy): metformin (generic) at home. A1c:  
Lab Results Component Value Date/Time Hemoglobin A1c 7.3 (H) 02/22/2019 03:47 AM  
 
 
Recent Glucose Results:  
Lab Results Component Value Date/Time  (H) 02/22/2019 03:47 AM  
  (H) 02/21/2019 03:29 PM  
 GLUCPOC 333 (H) 02/22/2019 06:36 AM  
 GLUCPOC 304 (H) 02/22/2019 06:31 AM  
 GLUCPOC 342 (H) 02/22/2019 06:30 AM  
  
 
Lab Results Component Value Date/Time Creatinine 1.36 (H) 02/22/2019 03:47 AM  
 
Estimated Creatinine Clearance: 57.9 mL/min (A) (based on SCr of 1.36 mg/dL (H)). Active Orders Diet DIET CARDIAC Regular PO intake: No data found. Will continue to follow as needed. Thank you Luís Peterson MS, RN, CDE Time spent: 6 minutes

## 2019-02-22 NOTE — PROGRESS NOTES
80 y.o. WM referred 2/21/2019 for evaluation of thrombocytopenia. HPI: 
No history of blood disorders. He has never been told he had low blood counts. (We note PLT count = 43k previously in [facility] records) He has noticed some \"rash\" and tongue blistering for several days. He preseted to [facility] where he had noticeable petechia. Laboratory testing  showed a PLT count = He was referred to [facility] ED. PLT count =5K 
 
2/21/2019 Transfused PLT 1 unit Solumedrol 125 mg Q12 
IVIGG 1 gm /kg ROS: 
No gross bleeding Tolerated transfusion without obvious reaction  
  
ALLERGIES: Pcn [penicillins] 
  
VSS 
NAD No oral/nasal bleeding No adenopathy Lung clear Cor RRR Abd soft no palpable liver/spleen Ext no edema Skin diffuse petechia 
--no new petechia Neuro Non focal 
 
 
Dx: Thrombocytopenia 
--appears autoimmune not microangiopathic Diabetes --non insulin dependent Rx: 
Continue glucocorticoids --can convert to po 2/23/2019 if PLY continue to rise Sliding scale insulin IVIGG 1 gm/kg daily x 2 
--second dose on 2/22/2019 Transfuse PLT prn <10K or active bleeding Disc with pt: 
 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 Possibly viral triggered ? ? Modest response to transfusion. Too early to assess response to [facility] and [place] start standard therapy. He did have low PLT in 2018 but no of his notes mention this and he was not informed  of this that he can recall Toxicity of steroids reviewed. He can conceivably  go home 2/23/2019 if PLT rising. However, he will need to go home on tapering steroids. He has no experience with home glucose monitoring or insulin injection. Our thanks to hospitalist.team for glucose control. He will need sliding scale insulin Expect recovery 2-3 days.

## 2019-02-23 LAB
ANA SER QL: NEGATIVE
BASOPHILS # BLD: 0 K/UL (ref 0–0.1)
BASOPHILS NFR BLD: 0 % (ref 0–1)
DIFFERENTIAL METHOD BLD: ABNORMAL
EOSINOPHIL # BLD: 0 K/UL (ref 0–0.4)
EOSINOPHIL NFR BLD: 0 % (ref 0–7)
ERYTHROCYTE [DISTWIDTH] IN BLOOD BY AUTOMATED COUNT: 12.9 % (ref 11.5–14.5)
GLUCOSE BLD STRIP.AUTO-MCNC: 273 MG/DL (ref 65–100)
GLUCOSE BLD STRIP.AUTO-MCNC: 319 MG/DL (ref 65–100)
GLUCOSE BLD STRIP.AUTO-MCNC: 321 MG/DL (ref 65–100)
GLUCOSE BLD STRIP.AUTO-MCNC: 322 MG/DL (ref 65–100)
HCT VFR BLD AUTO: 34.6 % (ref 36.6–50.3)
HGB BLD-MCNC: 11.9 G/DL (ref 12.1–17)
IMM GRANULOCYTES # BLD AUTO: 0.1 K/UL (ref 0–0.04)
IMM GRANULOCYTES NFR BLD AUTO: 1 % (ref 0–0.5)
INTERPRETATION, 117893: NORMAL
LYMPHOCYTES # BLD: 0.8 K/UL (ref 0.8–3.5)
LYMPHOCYTES NFR BLD: 7 % (ref 12–49)
MCH RBC QN AUTO: 31.7 PG (ref 26–34)
MCHC RBC AUTO-ENTMCNC: 34.4 G/DL (ref 30–36.5)
MCV RBC AUTO: 92.3 FL (ref 80–99)
MONOCYTES # BLD: 0.4 K/UL (ref 0–1)
MONOCYTES NFR BLD: 4 % (ref 5–13)
NEUTS SEG # BLD: 9.2 K/UL (ref 1.8–8)
NEUTS SEG NFR BLD: 88 % (ref 32–75)
NRBC # BLD: 0 K/UL (ref 0–0.01)
NRBC BLD-RTO: 0 PER 100 WBC
PLATELET # BLD AUTO: 55 K/UL (ref 150–400)
PMV BLD AUTO: 11.2 FL (ref 8.9–12.9)
RBC # BLD AUTO: 3.75 M/UL (ref 4.1–5.7)
SCREEN APTT: 34.9 SEC (ref 0–51.9)
SCREEN DRVVT: 37.4 SEC (ref 0–47)
SERVICE CMNT-IMP: ABNORMAL
TROPONIN I SERPL-MCNC: <0.05 NG/ML
WBC # BLD AUTO: 10.5 K/UL (ref 4.1–11.1)

## 2019-02-23 PROCEDURE — 82962 GLUCOSE BLOOD TEST: CPT

## 2019-02-23 PROCEDURE — 82595 ASSAY OF CRYOGLOBULIN: CPT

## 2019-02-23 PROCEDURE — 74011250637 HC RX REV CODE- 250/637: Performed by: INTERNAL MEDICINE

## 2019-02-23 PROCEDURE — 93005 ELECTROCARDIOGRAM TRACING: CPT

## 2019-02-23 PROCEDURE — 65270000029 HC RM PRIVATE

## 2019-02-23 PROCEDURE — 74011636637 HC RX REV CODE- 636/637: Performed by: INTERNAL MEDICINE

## 2019-02-23 PROCEDURE — 84484 ASSAY OF TROPONIN QUANT: CPT

## 2019-02-23 PROCEDURE — 36415 COLL VENOUS BLD VENIPUNCTURE: CPT

## 2019-02-23 PROCEDURE — 85025 COMPLETE CBC W/AUTO DIFF WBC: CPT

## 2019-02-23 PROCEDURE — 74011250636 HC RX REV CODE- 250/636: Performed by: INTERNAL MEDICINE

## 2019-02-23 RX ORDER — PREDNISONE 20 MG/1
60 TABLET ORAL
Status: DISCONTINUED | OUTPATIENT
Start: 2019-02-24 | End: 2019-02-25 | Stop reason: HOSPADM

## 2019-02-23 RX ORDER — INSULIN GLARGINE 100 [IU]/ML
26 INJECTION, SOLUTION SUBCUTANEOUS
Status: CANCELLED | OUTPATIENT
Start: 2019-02-23

## 2019-02-23 RX ORDER — INSULIN LISPRO 100 [IU]/ML
8 INJECTION, SOLUTION INTRAVENOUS; SUBCUTANEOUS
Status: CANCELLED | OUTPATIENT
Start: 2019-02-23

## 2019-02-23 RX ADMIN — Medication 10 ML: at 16:48

## 2019-02-23 RX ADMIN — Medication 3 MG: at 22:42

## 2019-02-23 RX ADMIN — LEVOTHYROXINE SODIUM 175 MCG: 150 TABLET ORAL at 07:04

## 2019-02-23 RX ADMIN — DIPHENHYDRAMINE HYDROCHLORIDE 5 ML: 12.5 SOLUTION ORAL at 07:05

## 2019-02-23 RX ADMIN — DIPHENHYDRAMINE HYDROCHLORIDE 5 ML: 12.5 SOLUTION ORAL at 12:01

## 2019-02-23 RX ADMIN — Medication 10 ML: at 07:06

## 2019-02-23 RX ADMIN — INSULIN LISPRO 10 UNITS: 100 INJECTION, SOLUTION INTRAVENOUS; SUBCUTANEOUS at 16:38

## 2019-02-23 RX ADMIN — DIPHENHYDRAMINE HYDROCHLORIDE 5 ML: 12.5 SOLUTION ORAL at 16:48

## 2019-02-23 RX ADMIN — DEXTROAMPHETAMINE SACCHARATE, AMPHETAMINE ASPARTATE, DEXTROAMPHETAMINE SULFATE AND AMPHETAMINE SULFATE 30 MG: 2.5; 2.5; 2.5; 2.5 TABLET ORAL at 16:45

## 2019-02-23 RX ADMIN — INSULIN LISPRO 10 UNITS: 100 INJECTION, SOLUTION INTRAVENOUS; SUBCUTANEOUS at 11:34

## 2019-02-23 RX ADMIN — INSULIN LISPRO 10 UNITS: 100 INJECTION, SOLUTION INTRAVENOUS; SUBCUTANEOUS at 07:30

## 2019-02-23 RX ADMIN — INSULIN LISPRO 4 UNITS: 100 INJECTION, SOLUTION INTRAVENOUS; SUBCUTANEOUS at 22:39

## 2019-02-23 RX ADMIN — INSULIN GLARGINE 20 UNITS: 100 INJECTION, SOLUTION SUBCUTANEOUS at 22:00

## 2019-02-23 RX ADMIN — INSULIN LISPRO 5 UNITS: 100 INJECTION, SOLUTION INTRAVENOUS; SUBCUTANEOUS at 11:35

## 2019-02-23 RX ADMIN — DEXTROAMPHETAMINE SACCHARATE, AMPHETAMINE ASPARTATE, DEXTROAMPHETAMINE SULFATE AND AMPHETAMINE SULFATE 30 MG: 2.5; 2.5; 2.5; 2.5 TABLET ORAL at 09:04

## 2019-02-23 RX ADMIN — INSULIN LISPRO 5 UNITS: 100 INJECTION, SOLUTION INTRAVENOUS; SUBCUTANEOUS at 07:30

## 2019-02-23 RX ADMIN — LISINOPRIL: 20 TABLET ORAL at 09:04

## 2019-02-23 RX ADMIN — Medication 10 ML: at 22:43

## 2019-02-23 RX ADMIN — INSULIN LISPRO 5 UNITS: 100 INJECTION, SOLUTION INTRAVENOUS; SUBCUTANEOUS at 16:38

## 2019-02-23 RX ADMIN — Medication 10 ML: at 16:43

## 2019-02-23 RX ADMIN — METHYLPREDNISOLONE SODIUM SUCCINATE 125 MG: 125 INJECTION, POWDER, FOR SOLUTION INTRAMUSCULAR; INTRAVENOUS at 09:04

## 2019-02-23 NOTE — PROGRESS NOTES
80 y.o. WM referred 2/21/2019 for evaluation of thrombocytopenia. HPI: 
No history of blood disorders. He has never been told he had low blood counts. (We note PLT count = 43k previously in [facility] records) He has noticed some \"rash\" and tongue blistering for several days. He preseted to [facility] where he had noticeable petechia. Laboratory testing  showed a PLT count = He was referred to [facility] ED. PLT count =5K 
 
2/21/2019 Transfused PLT 1 unit Solumedrol 125 mg Q12 
IVIGG 1 gm /kg ROS: 
No gross bleeding 
  
  
ALLERGIES: Pcn [penicillins] 
  
VSS 
NAD No oral/nasal bleeding No adenopathy Lung clear Cor RRR Abd soft non tender Skin diffuse petechae- 
Neuro Non focal 
 
 
Dx: Thrombocytopenia ITP. . S/p IVIG and high dose steroids. Naoma Broccoli Naoma Broccoli Count up to 55k today. .. Diabetes --non insulin dependent Rx: 
Continue glucocorticoids --can convert to prednisone 60 mg po today. . Pt. Will be able to go home soon but needs to have adequate diabetic control and ability to give insulin at discharge. . Will need to f/u with un next week Lindsay Ferris MD

## 2019-02-23 NOTE — PROGRESS NOTES
I received call from nurse who incidentally noted that EKG ordered on day of admission on 2/21/2019 had not been performed. As such, 12 lead EKG (which I reviewed) was performed tonight showing sinus tachycardia, incomplete right bundle branch block, at 101 bpm.  There is no comparison EKG available for review. Nurse notes that patient is asymptomatic. Will order labs including troponin this a.m.

## 2019-02-23 NOTE — PROGRESS NOTES
02/22/19 2007 Vitals Temp 98.3 °F (36.8 °C) Temp Source Oral  
Pulse (Heart Rate) (!) 112 Heart Rate Source Monitor Resp Rate 17  
O2 Sat (%) 95 % Level of Consciousness Alert  
BP (!) 165/95 MAP (Calculated) 118 BP 1 Location Right arm BP 1 Method Automatic MEWS Score 3 RN Ruchi at bedside to assess.  
 
 
[id number] - paged hospitalist

## 2019-02-23 NOTE — PROGRESS NOTES
Hospitalist Progress Note Bobby Herman MD 
Answering service: [phone] OR [phone] from in house phone Date of Service:  2019 NAME:  Tc Shay :  1950 MRN:  [id number] Admission Summary:  
68M p/w Plt of 5, diagnosed with ITP, mild petechiae/ bruises Interval history / Subjective:  
Patient seen and examined at bedside, sugars still high, increased insulin doses, plt 55 today Assessment & Plan: #. Acute ITP: POA 
- Transfused platelets, came up from 5 to 29. Hematology following, steroids, and IVIG. - Monitor platelet count closely. Monitor for any signs of bleeding, 
  
#. DM2: uncontrolled on admission with hyperglycemia: A1c 7.3. On iv steroids 
- resistant SSI, lantus, acuChecks, PreMeal insulin, Monitor and adjust insulin, diabetic diet.  
  
#. Hypothyroidism: TSH 11, pt not consistent in taking, also takes with food, educated. F/u PCP #. HTN: home regimen, monitor, prn antihypertensives. #. Chronic R shoulder pain, s/p surgery last year. Op f/u with ortho, analgesia PRN #. Insomnia: on steroids, melatonin prn 
  
DVT px: SCDs Code status: Full Care Plan discussed with: Patient/Family and Nurse Disposition: Home w/Family Hospital Problems  Never Reviewed Codes Class Noted POA Thrombocytopenia ([facility] [address] ICD-10-CM: D69.6 ICD-9-CM: 287.5  2019 Yes * (Principal) Acute ITP ([facility] [address] ICD-10-CM: D69.3 ICD-9-CM: 287.31  2019 Yes Diabetes mellitus type 2, controlled ([facility] [address] ICD-10-CM: E11.9 ICD-9-CM: 250.00  2019 Yes Chronic left shoulder pain ICD-10-CM: M25.512, G89.29 ICD-9-CM: 719.41, 338.29  2019 Yes Review of Systems:  
Pertinent items are mentioned in interval history. Vital Signs:  
 Last 24hrs VS reviewed since prior progress note. Most recent are: 
Visit Vitals /86 (BP 1 Location: Right arm, BP Patient Position: At rest) Pulse 94 Temp 98.3 °F (36.8 °C) Resp 17 Ht 5' 9\" (1.753 m) Wt 88.2 kg (194 lb 7.1 oz) SpO2 96% BMI 28.71 kg/m² Intake/Output Summary (Last 24 hours) at 2/23/2019 1003 Last data filed at 2/22/2019 6358 Gross per 24 hour Intake 120 ml Output  Net 120 ml Physical Examination:  
General:  Alert, oriented, No acute distress Extremities:  No cyanosis or clubbing, no significant edema Skin: wide spread petechiae, arms, legs, torso Psych:  Good insight, AAOx3, not agitated. Data Review:  
 Review and/or order of clinical lab test 
Review and/or order of tests in the radiology section of CPT Review and/or order of tests in the medicine section of CPT Labs:  
 
Recent Labs  
  02/23/19 
0214 02/22/19 
0144 WBC 10.5 4.7 HGB 11.9* 12.1 HCT 34.6* 36.7 PLT 55* 29* Recent Labs  
  02/22/19 
0347 02/21/19 
1529 * 138  
K 3.8 3.7  104 CO2 23 26 BUN 19 21* CREA 1.36* 1.25  
* 222* CA 8.0* 8.8 Recent Labs  
  02/21/19 
1529 SGOT 23 ALT 36  
AP 53 TBILI 0.5 TP 7.5 ALB 3.8 GLOB 3.7 Recent Labs  
  02/21/19 
2050 INR 1.0 PTP 10.0 APTT 25.3 No results for input(s): FE, TIBC, PSAT, FERR in the last 72 hours. No results found for: FOL, RBCF No results for input(s): PH, PCO2, PO2 in the last 72 hours. Recent Labs  
  02/23/19 
0220 TROIQ <0.05 No results found for: CHOL, CHOLX, CHLST, CHOLV, HDL, LDL, LDLC, DLDLP, TGLX, TRIGL, TRIGP, CHHD, CHHDX Lab Results Component Value Date/Time Glucose (POC) 322 (H) 02/23/2019 06:17 AM  
 Glucose (POC) 294 (H) 02/22/2019 09:28 PM  
 Glucose (POC) 288 (H) 02/22/2019 04:36 PM  
 Glucose (POC) 385 (H) 02/22/2019 12:19 PM  
 Glucose (POC) 333 (H) 02/22/2019 06:36 AM  
 
No results found for: COLOR, APPRN, SPGRU, REFSG, FRITZ, PROTU, GLUCU, Jay Eric, BILU, UROU, BILLY, LEUKU, GLUKE, EPSU, BACTU, WBCU, RBCU, CASTS, UCRY Medications Reviewed: Current Facility-Administered Medications Medication Dose Route Frequency  insulin glargine (LANTUS) injection 20 Units  20 Units SubCUTAneous QHS  insulin lispro (HUMALOG) injection   SubCUTAneous AC&HS  
 dextrose (D50W) injection syrg 12.5-25 g  12.5-25 g IntraVENous PRN  
 insulin lispro (HUMALOG) injection 5 Units  5 Units SubCUTAneous TIDAC  magic mouthwash cpd (without sucralfate)  5 mL Oral TIDAC  melatonin tablet 3 mg  3 mg Oral QHS PRN  
 0.9% sodium chloride infusion 250 mL  250 mL IntraVENous PRN  
 sodium chloride (NS) flush 5-40 mL  5-40 mL IntraVENous Q8H  
 sodium chloride (NS) flush 5-40 mL  5-40 mL IntraVENous PRN  
 HYDROcodone-acetaminophen (NORCO) 5-325 mg per tablet 1 Tab  1 Tab Oral Q4H PRN  
 ondansetron (ZOFRAN) injection 4 mg  4 mg IntraVENous Q4H PRN  
 dextroamphetamine-amphetamine (ADDERALL) tablet 30 mg  30 mg Oral BID  levothyroxine (SYNTHROID) tablet 175 mcg  175 mcg Oral ACB  glucose chewable tablet 16 g  4 Tab Oral PRN  
 dextrose (D50W) injection syrg 12.5-25 g  25-50 mL IntraVENous PRN  
 glucagon (GLUCAGEN) injection 1 mg  1 mg IntraMUSCular PRN  
 methylPREDNISolone (PF) (Solu-MEDROL) injection 125 mg  125 mg IntraVENous Q12H  
 0.9% sodium chloride infusion 250 mL  250 mL IntraVENous PRN  
 lisinopril/hydroCHLOROthiazide(PRINZIDE/ZESTORETIC) 20/25 mg   Oral DAILY  
______________________________________________________________________ EXPECTED LENGTH OF STAY: 3d 16h ACTUAL LENGTH OF STAY:          2 Reta Hernandez MD

## 2019-02-24 LAB
ANION GAP SERPL CALC-SCNC: 6 MMOL/L (ref 5–15)
ATRIAL RATE: 101 BPM
BASOPHILS # BLD: 0 K/UL (ref 0–0.1)
BASOPHILS NFR BLD: 0 % (ref 0–1)
BUN SERPL-MCNC: 24 MG/DL (ref 6–20)
BUN/CREAT SERPL: 21 (ref 12–20)
CALCIUM SERPL-MCNC: 8.3 MG/DL (ref 8.5–10.1)
CALCULATED P AXIS, ECG09: 89 DEGREES
CALCULATED R AXIS, ECG10: -19 DEGREES
CALCULATED T AXIS, ECG11: 18 DEGREES
CHLORIDE SERPL-SCNC: 98 MMOL/L (ref 97–108)
CO2 SERPL-SCNC: 31 MMOL/L (ref 21–32)
CREAT SERPL-MCNC: 1.13 MG/DL (ref 0.7–1.3)
DIAGNOSIS, 93000: NORMAL
DIFFERENTIAL METHOD BLD: ABNORMAL
EOSINOPHIL # BLD: 0 K/UL (ref 0–0.4)
EOSINOPHIL NFR BLD: 0 % (ref 0–7)
ERYTHROCYTE [DISTWIDTH] IN BLOOD BY AUTOMATED COUNT: 13.1 % (ref 11.5–14.5)
GLUCOSE BLD STRIP.AUTO-MCNC: 151 MG/DL (ref 65–100)
GLUCOSE BLD STRIP.AUTO-MCNC: 170 MG/DL (ref 65–100)
GLUCOSE BLD STRIP.AUTO-MCNC: 178 MG/DL (ref 65–100)
GLUCOSE BLD STRIP.AUTO-MCNC: 242 MG/DL (ref 65–100)
GLUCOSE SERPL-MCNC: 130 MG/DL (ref 65–100)
HCT VFR BLD AUTO: 36.8 % (ref 36.6–50.3)
HCV RNA SERPL QL NAA+PROBE: NEGATIVE
HGB BLD-MCNC: 12.8 G/DL (ref 12.1–17)
IMM GRANULOCYTES # BLD AUTO: 0.2 K/UL (ref 0–0.04)
IMM GRANULOCYTES NFR BLD AUTO: 1 % (ref 0–0.5)
LYMPHOCYTES # BLD: 1.8 K/UL (ref 0.8–3.5)
LYMPHOCYTES NFR BLD: 12 % (ref 12–49)
MCH RBC QN AUTO: 31.3 PG (ref 26–34)
MCHC RBC AUTO-ENTMCNC: 34.8 G/DL (ref 30–36.5)
MCV RBC AUTO: 90 FL (ref 80–99)
MONOCYTES # BLD: 1.1 K/UL (ref 0–1)
MONOCYTES NFR BLD: 7 % (ref 5–13)
NEUTS SEG # BLD: 12.8 K/UL (ref 1.8–8)
NEUTS SEG NFR BLD: 81 % (ref 32–75)
NRBC # BLD: 0 K/UL (ref 0–0.01)
NRBC BLD-RTO: 0 PER 100 WBC
P-R INTERVAL, ECG05: 178 MS
PLATELET # BLD AUTO: 109 K/UL (ref 150–400)
PMV BLD AUTO: 10.5 FL (ref 8.9–12.9)
POTASSIUM SERPL-SCNC: 4 MMOL/L (ref 3.5–5.1)
Q-T INTERVAL, ECG07: 356 MS
QRS DURATION, ECG06: 92 MS
QTC CALCULATION (BEZET), ECG08: 461 MS
RBC # BLD AUTO: 4.09 M/UL (ref 4.1–5.7)
SERVICE CMNT-IMP: ABNORMAL
SODIUM SERPL-SCNC: 135 MMOL/L (ref 136–145)
VENTRICULAR RATE, ECG03: 101 BPM
WBC # BLD AUTO: 15.9 K/UL (ref 4.1–11.1)

## 2019-02-24 PROCEDURE — 36415 COLL VENOUS BLD VENIPUNCTURE: CPT

## 2019-02-24 PROCEDURE — 74011636637 HC RX REV CODE- 636/637: Performed by: INTERNAL MEDICINE

## 2019-02-24 PROCEDURE — 85025 COMPLETE CBC W/AUTO DIFF WBC: CPT

## 2019-02-24 PROCEDURE — 65270000029 HC RM PRIVATE

## 2019-02-24 PROCEDURE — 80048 BASIC METABOLIC PNL TOTAL CA: CPT

## 2019-02-24 PROCEDURE — 74011250637 HC RX REV CODE- 250/637: Performed by: INTERNAL MEDICINE

## 2019-02-24 PROCEDURE — 82962 GLUCOSE BLOOD TEST: CPT

## 2019-02-24 RX ORDER — INSULIN LISPRO 100 [IU]/ML
8 INJECTION, SOLUTION INTRAVENOUS; SUBCUTANEOUS
Status: DISCONTINUED | OUTPATIENT
Start: 2019-02-24 | End: 2019-02-25 | Stop reason: HOSPADM

## 2019-02-24 RX ADMIN — DIPHENHYDRAMINE HYDROCHLORIDE 5 ML: 12.5 SOLUTION ORAL at 07:48

## 2019-02-24 RX ADMIN — Medication 10 ML: at 07:49

## 2019-02-24 RX ADMIN — LEVOTHYROXINE SODIUM 175 MCG: 150 TABLET ORAL at 07:30

## 2019-02-24 RX ADMIN — DIPHENHYDRAMINE HYDROCHLORIDE 5 ML: 12.5 SOLUTION ORAL at 16:33

## 2019-02-24 RX ADMIN — Medication 10 ML: at 14:11

## 2019-02-24 RX ADMIN — INSULIN LISPRO 3 UNITS: 100 INJECTION, SOLUTION INTRAVENOUS; SUBCUTANEOUS at 11:21

## 2019-02-24 RX ADMIN — INSULIN LISPRO 3 UNITS: 100 INJECTION, SOLUTION INTRAVENOUS; SUBCUTANEOUS at 06:56

## 2019-02-24 RX ADMIN — INSULIN LISPRO 8 UNITS: 100 INJECTION, SOLUTION INTRAVENOUS; SUBCUTANEOUS at 16:33

## 2019-02-24 RX ADMIN — INSULIN LISPRO 5 UNITS: 100 INJECTION, SOLUTION INTRAVENOUS; SUBCUTANEOUS at 06:55

## 2019-02-24 RX ADMIN — INSULIN LISPRO 4 UNITS: 100 INJECTION, SOLUTION INTRAVENOUS; SUBCUTANEOUS at 16:32

## 2019-02-24 RX ADMIN — PREDNISONE 60 MG: 20 TABLET ORAL at 07:08

## 2019-02-24 RX ADMIN — DIPHENHYDRAMINE HYDROCHLORIDE 5 ML: 12.5 SOLUTION ORAL at 11:21

## 2019-02-24 RX ADMIN — DEXTROAMPHETAMINE SACCHARATE, AMPHETAMINE ASPARTATE, DEXTROAMPHETAMINE SULFATE AND AMPHETAMINE SULFATE 30 MG: 2.5; 2.5; 2.5; 2.5 TABLET ORAL at 14:11

## 2019-02-24 RX ADMIN — INSULIN GLARGINE 20 UNITS: 100 INJECTION, SOLUTION SUBCUTANEOUS at 22:14

## 2019-02-24 RX ADMIN — LISINOPRIL: 20 TABLET ORAL at 08:10

## 2019-02-24 RX ADMIN — Medication 10 ML: at 22:15

## 2019-02-24 RX ADMIN — INSULIN LISPRO 8 UNITS: 100 INJECTION, SOLUTION INTRAVENOUS; SUBCUTANEOUS at 11:22

## 2019-02-24 RX ADMIN — DEXTROAMPHETAMINE SACCHARATE, AMPHETAMINE ASPARTATE, DEXTROAMPHETAMINE SULFATE AND AMPHETAMINE SULFATE 30 MG: 2.5; 2.5; 2.5; 2.5 TABLET ORAL at 07:07

## 2019-02-24 NOTE — PROGRESS NOTES
80 y.o. WM referred 2/21/2019 for evaluation of thrombocytopenia. Subjective,, no c/o pain, bleeding 2/21/2019 Transfused PLT 1 unit Solumedrol 125 mg Q12 
IVIGG 1 gm /kg 2/24. Heddie Steedman Platelet count up to 92k today ROS: 
No gross bleeding 
  
  
ALLERGIES: Pcn [penicillins] 
  
VSS 
NAD No oral/nasal bleeding No adenopathy Lung clear Cor RRR Abd soft non tender Skin diffuse petechae- 
Neuro Non focal 
 
 
Dx: Thrombocytopenia ITP. . S/p IVIG and high dose steroids. Heddie Steedman Heddie Steedman Count up to 92k today. .. Diabetes --non insulin dependent Rx: 
Continue glucocorticoids 
 prednisone 60 mg po today. . Pt. Will be able to go home soon but needs to have adequate diabetic control and ability to give insulin at discharge. . Will need to f/u with un next week Jair Bailey MD

## 2019-02-24 NOTE — PROGRESS NOTES
Hospitalist Progress Note Mallory Crook MD 
Answering service: [phone] OR [phone] from in house phone Date of Service:  2019 NAME:  Eladia Arndt :  1950 MRN:  [id number] Admission Summary:  
68M p/w Plt of 5, diagnosed with ITP, mild petechiae/ bruises Interval history / Subjective:  
Patient seen and examined at bedside, plts going up, however sugars very high, needing further adjusting of insulin. Steroids po now Assessment & Plan: #. Acute ITP: POA 
- Transfused platelets, came up from 5 to 29. Hematology following, steroids, and IVIG. - Monitor platelet count closely. Monitor for any signs of bleeding, 
  
#. DM2: uncontrolled on admission with hyperglycemia: A1c 7.3. On iv steroids 
- resistant SSI, lantus, acuChecks, PreMeal insulin, Monitor and adjust insulin, diabetic diet.  
  
#. Hypothyroidism: TSH 11, pt not consistent in taking, also takes with food, educated. F/u PCP #. HTN: home regimen, monitor, prn antihypertensives. #. Chronic R shoulder pain, s/p surgery last year. Op f/u with ortho, analgesia PRN #. Insomnia: on steroids, melatonin prn 
  
DVT px: SCDs Code status: Full Care Plan discussed with: Patient/Family and Nurse Disposition: Home w/Family Hospital Problems  Never Reviewed Codes Class Noted POA Thrombocytopenia ([facility] [place] [address] ICD-10-CM: D69.6 ICD-9-CM: 287.5  2019 Yes * (Principal) Acute ITP ([facility] [place] [address] ICD-10-CM: D69.3 ICD-9-CM: 287.31  2019 Yes Diabetes mellitus type 2, controlled ([facility] [place] [address] ICD-10-CM: E11.9 ICD-9-CM: 250.00  2019 Yes Chronic left shoulder pain ICD-10-CM: M25.512, G89.29 ICD-9-CM: 719.41, 338.29  2019 Yes Review of Systems:  
Pertinent items are mentioned in interval history. Vital Signs:  
 Last 24hrs VS reviewed since prior progress note. Most recent are: 
Visit Vitals /83 Pulse 99 Temp 98 °F (36.7 °C) Resp 18 Ht 5' 9\" (1.753 m) Wt 88.2 kg (194 lb 7.1 oz) SpO2 95% BMI 28.71 kg/m² Intake/Output Summary (Last 24 hours) at 2/24/2019 1710 Last data filed at 2/24/2019 1219 Gross per 24 hour Intake 720 ml Output  Net 720 ml Physical Examination:  
General:  Alert, oriented, No acute distress Extremities:  No cyanosis or clubbing, no significant edema Skin: wide spread petechiae, arms, legs, torso Psych:  Good insight, AAOx3, not agitated. Data Review:  
 Review and/or order of clinical lab test 
Review and/or order of tests in the radiology section of CPT Review and/or order of tests in the medicine section of CPT Labs:  
 
Recent Labs  
  02/24/19 
0700 02/23/19 
0214 WBC 15.9* 10.5 HGB 12.8 11.9*  
HCT 36.8 34.6*  
* 55* Recent Labs  
  02/24/19 
0700 02/22/19 
0347 * 132* K 4.0 3.8 CL 98 100 CO2 31 23 BUN 24* 19  
CREA 1.13 1.36* * 264* CA 8.3* 8.0* No results for input(s): SGOT, GPT, ALT, AP, TBIL, TBILI, TP, ALB, GLOB, GGT, AML, LPSE in the last 72 hours. No lab exists for component: AMYP, HLPSE Recent Labs  
  02/21/19 
2050 INR 1.0 PTP 10.0 APTT 25.3 No results for input(s): FE, TIBC, PSAT, FERR in the last 72 hours. No results found for: FOL, RBCF No results for input(s): PH, PCO2, PO2 in the last 72 hours. Recent Labs  
  02/23/19 
0220 TROIQ <0.05 No results found for: CHOL, CHOLX, CHLST, CHOLV, HDL, LDL, LDLC, DLDLP, TGLX, TRIGL, TRIGP, CHHD, CHHDX Lab Results Component Value Date/Time Glucose (POC) 242 (H) 02/24/2019 04:09 PM  
 Glucose (POC) 178 (H) 02/24/2019 11:11 AM  
 Glucose (POC) 151 (H) 02/24/2019 06:41 AM  
 Glucose (POC) 273 (H) 02/23/2019 09:02 PM  
 Glucose (POC) 319 (H) 02/23/2019 04:12 PM  
 
No results found for: COLOR, APPRN, SPGRU, REFSG, FRITZ, PROTU, GLUCU, Nando Juan Carlos, BILU, UROU, BILLY, LEUKU, GLUKE, EPSU, BACTU, WBCU, RBCU, CASTS, UCRY Medications Reviewed:  
 
Current Facility-Administered Medications Medication Dose Route Frequency  insulin lispro (HUMALOG) injection 8 Units  8 Units SubCUTAneous TIDAC  predniSONE (DELTASONE) tablet 60 mg  60 mg Oral DAILY WITH BREAKFAST  insulin glargine (LANTUS) injection 20 Units  20 Units SubCUTAneous QHS  insulin lispro (HUMALOG) injection   SubCUTAneous AC&HS  
 dextrose (D50W) injection syrg 12.5-25 g  12.5-25 g IntraVENous PRN  
 magic mouthwash cpd (without sucralfate)  5 mL Oral TIDAC  melatonin tablet 3 mg  3 mg Oral QHS PRN  
 0.9% sodium chloride infusion 250 mL  250 mL IntraVENous PRN  
 sodium chloride (NS) flush 5-40 mL  5-40 mL IntraVENous Q8H  
 sodium chloride (NS) flush 5-40 mL  5-40 mL IntraVENous PRN  
 HYDROcodone-acetaminophen (NORCO) 5-325 mg per tablet 1 Tab  1 Tab Oral Q4H PRN  
 ondansetron (ZOFRAN) injection 4 mg  4 mg IntraVENous Q4H PRN  
 dextroamphetamine-amphetamine (ADDERALL) tablet 30 mg  30 mg Oral BID  levothyroxine (SYNTHROID) tablet 175 mcg  175 mcg Oral ACB  glucose chewable tablet 16 g  4 Tab Oral PRN  
 dextrose (D50W) injection syrg 12.5-25 g  25-50 mL IntraVENous PRN  
 glucagon (GLUCAGEN) injection 1 mg  1 mg IntraMUSCular PRN  
 0.9% sodium chloride infusion 250 mL  250 mL IntraVENous PRN  
 lisinopril/hydroCHLOROthiazide(PRINZIDE/ZESTORETIC) 20/25 mg   Oral DAILY  
______________________________________________________________________ EXPECTED LENGTH OF STAY: 3d 16h ACTUAL LENGTH OF STAY:          3 Antoinette Senior MD

## 2019-02-25 VITALS
RESPIRATION RATE: 16 BRPM | HEART RATE: 98 BPM | TEMPERATURE: 98.5 F | DIASTOLIC BLOOD PRESSURE: 91 MMHG | WEIGHT: 194.45 LBS | SYSTOLIC BLOOD PRESSURE: 145 MMHG | BODY MASS INDEX: 28.8 KG/M2 | OXYGEN SATURATION: 97 % | HEIGHT: 69 IN

## 2019-02-25 LAB
CA TITR SERPL AGGL: NEGATIVE {TITER}
ERYTHROCYTE [DISTWIDTH] IN BLOOD BY AUTOMATED COUNT: 13 % (ref 11.5–14.5)
GLUCOSE BLD STRIP.AUTO-MCNC: 123 MG/DL (ref 65–100)
GLUCOSE BLD STRIP.AUTO-MCNC: 205 MG/DL (ref 65–100)
H PYLORI IGA SER-ACNC: <9 UNITS (ref 0–8.9)
H PYLORI IGG SER IA-ACNC: <0.8 INDEX VALUE (ref 0–0.79)
HCT VFR BLD AUTO: 37.9 % (ref 36.6–50.3)
HGB BLD-MCNC: 13.1 G/DL (ref 12.1–17)
IGA SERPL-MCNC: 230 MG/DL (ref 61–437)
IGG SERPL-MCNC: 886 MG/DL (ref 700–1600)
IGM SERPL-MCNC: 122 MG/DL (ref 20–172)
MCH RBC QN AUTO: 31 PG (ref 26–34)
MCHC RBC AUTO-ENTMCNC: 34.6 G/DL (ref 30–36.5)
MCV RBC AUTO: 89.8 FL (ref 80–99)
NRBC # BLD: 0 K/UL (ref 0–0.01)
NRBC BLD-RTO: 0 PER 100 WBC
PLATELET # BLD AUTO: 124 K/UL (ref 150–400)
PMV BLD AUTO: 9.5 FL (ref 8.9–12.9)
PROT PATTERN SERPL IFE-IMP: NORMAL
RBC # BLD AUTO: 4.22 M/UL (ref 4.1–5.7)
SERVICE CMNT-IMP: ABNORMAL
SERVICE CMNT-IMP: ABNORMAL
WBC # BLD AUTO: 11.1 K/UL (ref 4.1–11.1)

## 2019-02-25 PROCEDURE — 85027 COMPLETE CBC AUTOMATED: CPT

## 2019-02-25 PROCEDURE — 82962 GLUCOSE BLOOD TEST: CPT

## 2019-02-25 PROCEDURE — 74011636637 HC RX REV CODE- 636/637: Performed by: INTERNAL MEDICINE

## 2019-02-25 PROCEDURE — 74011250637 HC RX REV CODE- 250/637: Performed by: INTERNAL MEDICINE

## 2019-02-25 PROCEDURE — 36415 COLL VENOUS BLD VENIPUNCTURE: CPT

## 2019-02-25 RX ORDER — PREDNISONE 20 MG/1
60 TABLET ORAL
Qty: 42 TAB | Refills: 0 | Status: SHIPPED | OUTPATIENT
Start: 2019-02-26 | End: 2019-03-12

## 2019-02-25 RX ORDER — INSULIN GLARGINE 100 [IU]/ML
INJECTION, SOLUTION SUBCUTANEOUS
Qty: 1 PEN | Refills: 0 | Status: SHIPPED | OUTPATIENT
Start: 2019-02-25

## 2019-02-25 RX ORDER — INSULIN LISPRO 100 [IU]/ML
INJECTION, SOLUTION INTRAVENOUS; SUBCUTANEOUS
Qty: 1 PEN | Refills: 0 | Status: SHIPPED | OUTPATIENT
Start: 2019-02-25

## 2019-02-25 RX ADMIN — DEXTROAMPHETAMINE SACCHARATE, AMPHETAMINE ASPARTATE, DEXTROAMPHETAMINE SULFATE AND AMPHETAMINE SULFATE 30 MG: 2.5; 2.5; 2.5; 2.5 TABLET ORAL at 07:03

## 2019-02-25 RX ADMIN — DIPHENHYDRAMINE HYDROCHLORIDE 5 ML: 12.5 SOLUTION ORAL at 11:57

## 2019-02-25 RX ADMIN — INSULIN LISPRO 8 UNITS: 100 INJECTION, SOLUTION INTRAVENOUS; SUBCUTANEOUS at 07:02

## 2019-02-25 RX ADMIN — Medication 10 ML: at 07:05

## 2019-02-25 RX ADMIN — INSULIN LISPRO 4 UNITS: 100 INJECTION, SOLUTION INTRAVENOUS; SUBCUTANEOUS at 11:54

## 2019-02-25 RX ADMIN — LEVOTHYROXINE SODIUM 175 MCG: 150 TABLET ORAL at 07:04

## 2019-02-25 RX ADMIN — INSULIN LISPRO 8 UNITS: 100 INJECTION, SOLUTION INTRAVENOUS; SUBCUTANEOUS at 11:55

## 2019-02-25 RX ADMIN — PREDNISONE 60 MG: 20 TABLET ORAL at 07:03

## 2019-02-25 RX ADMIN — LISINOPRIL: 20 TABLET ORAL at 08:41

## 2019-02-25 RX ADMIN — DIPHENHYDRAMINE HYDROCHLORIDE 5 ML: 12.5 SOLUTION ORAL at 07:04

## 2019-02-25 NOTE — PROGRESS NOTES
Spiritual Care Assessment/Progress Note [address] 
 
 
NAME: Ric Piña      MRN: [id number] AGE: 80 y.o. SEX: male Yarsanism Affiliation: Martina Castro Language: English  
 
2/25/2019     Total Time (in minutes): 15 Spiritual Assessment begun in [facility] through conversation with: 
  
    [x]Patient        [] Family    [] Friend(s) Reason for Consult: Initial/Spiritual assessment, patient floor Spiritual beliefs: (Please include comment if needed) [x] Identifies with a brittani tradition:     
   [] Supported by a brittani community:        
   [] Claims no spiritual orientation:       
   [] Seeking spiritual identity:            
   [] Adheres to an individual form of spirituality:       
   [] Not able to assess:                   
 
    
Identified resources for coping:  
   [x] Prayer                           
   [] Music                  [] Guided Imagery 
   [] Family/friends                 [] Pet visits [] Devotional reading                         [] Unknown 
   [] Other:                                         
 
 
Interventions offered during this visit: (See comments for more details) Patient Interventions: Affirmation of emotions/emotional suffering, Affirmation of brittani, Other (comment), Prayer (assurance of), Normalization of emotional/spiritual concerns Plan of Care: 
 
 [] Support spiritual and/or cultural needs  
 [] Support AMD and/or advance care planning process    
 [] Support grieving process 
 [] Coordinate Rites and/or Rituals  
 [] Coordination with community clergy [] No spiritual needs identified at this time 
 [] Detailed Plan of Care below (See Comments)  [] Make referral to Music Therapy 
[] Make referral to Pet Therapy    
[] Make referral to Addiction services 
[] Make referral to [facility] 
[] Make referral to Spiritual Care Partner 
[] No future visits requested       
[x] Follow up visits as needed Comments: Initial spiritual assessment in Room 612/01. Patient was sitting in a wheelchair ready to be discharged. Patient spoke about his illness, happy to be going home, No family present at the time of visit. Provided prayer and listening. Advised of  Availability. Rev. Jame Herron. [place]

## 2019-02-25 NOTE — DIABETES MGMT
DTC Consult Note Recommendations/ Comments:  
 
Current hospital DM medication: Lantus 20 units daily, Humalog for correction, insulin resistant scale and Humalog 8 units AC Also on Prednisone 60 mg daily Consult received for:  
   [x]             Insulin instruction Chart reviewed and initial evaluation complete on Ekta Alonzo. Pt is known to DTC from outpatient DSME group classes in 2017. Patient is a 80 y.o. male with hx Type 2 Diabetes on Metformin  mg TID at home. BG monitoring at home 2 times per day. Assessed and instructed patient on the following:  
·  interpretation of lab results, blood sugar goals, hypoglycemia prevention and treatment, SMBG skills, nutrition, site rotation, use of insulin pen and self-injection of insulin Encouraged the following:  
· regular blood sugar monitorin-3 times daily Provided patient with the following: [x]             Survival skills education materials []             Insulin education materials []             CHO counting education materials [x]             Outpatient DTC contact number 
             []             Glucometer Discussed with patient and/or family need for follow up appointment for diabetes management after discharge. A1c:  
Lab Results Component Value Date/Time Hemoglobin A1c 7.3 (H) 2019 03:47 AM  
 
 
Recent Glucose Results:  
Lab Results Component Value Date/Time GLUCPOC 205 (H) 2019 11:23 AM  
 GLUCPOC 123 (H) 2019 06:29 AM  
 GLUCPOC 170 (H) 2019 09:32 PM  
  
 
Lab Results Component Value Date/Time Creatinine 1.13 2019 07:00 AM  
 
Estimated Creatinine Clearance: 69.7 mL/min (based on SCr of 1.13 mg/dL). Active Orders Diet DIET DIABETIC WITH OPTIONS Consistent Carb 2000kcal; Regular; AHA-LOW-CHOL FAT  
  
 
PO intake:  
Patient Vitals for the past 72 hrs: 
 % Diet Eaten 02/25/19 0841 100 % 02/24/19 1741 100 % 02/24/19 1219 100 % 02/24/19 0807 80 % 02/23/19 1820 100 % 02/23/19 1154 90 % 02/23/19 0815 100 % 02/22/19 1333 80 % Will continue to follow as needed. Thank you. Ani Keen RD, CDE [facility] Pager: [phone] Time spent: 15  min

## 2019-02-25 NOTE — DISCHARGE SUMMARY
Discharge Summary       PATIENT ID: Elayne Champagne  MRN: [id number]   YOB: 1950    DATE OF ADMISSION: 2/21/2019  3:44 PM    DATE OF DISCHARGE: 2/25/2019   PRIMARY CARE PROVIDER: Alma Lu MD     ATTENDING PHYSICIAN: Reta Hernandez MD  DISCHARGING PROVIDER: Reta Hernandez MD    To contact this individual call [phone] and ask the  to page. If unavailable ask to be transferred the Adult Hospitalist Department. CONSULTATIONS: IP CONSULT TO HEMATOLOGY  IP CONSULT TO HOSPITALIST    PROCEDURES/SURGERIES: * No surgery found *    ADMITTING [address] [place] [address] COURSE:   Admitted with low plts, diagnosed with acute ITP, responded to treatment with IVIG and steroids, sugars were very high with steroid therapy, started on insulin, lantus and sliding scale 'per pt/wife request skipped preMeal insulins'. Will f/u with Dr Tea Zuleta from hematology in 3days. Risk of Re-Admission: mod  DISCHARGE DIAGNOSES / PLAN:      #. Acute ITP: POA  - Transfused platelets, came up from 5 to 29. Hematology following, steroids, and IVIG. - Monitor platelet count closely. Monitor for any signs of bleeding, plt upto 124 on dc. F/u with Hematology in 3days as scheduled.     #. DM2: uncontrolled on admission with hyperglycemia: A1c 7.3. On iv steroids  - resistant SSI, lantus, acuChecks, PreMeal insulin, Monitor and adjust insulin, diabetic diet. On dc patient was started on lantus and sliding scale 'without preMeal insulins per his request', will keep checking his sugars 4times/day. If any issues needs to call his PCP and/or Diabetes education nurse.      #. Hypothyroidism: TSH 11, pt not consistent in taking, also takes with food, educated about appropriate way to take synthroid. F/u PCP  #. HTN: home regimen, monitor, prn antihypertensives.      FOLLOW UP APPOINTMENTS:    Follow-up Information     Follow up With Specialties Details Why Contact Info    Alma Lu MD Family Practice In 1 week  [address] [address]  [phone]      Any Preciado MD Hematology and Oncology, Hematology, Oncology  As scheduled on Thursday 2/28/19 [address] [id number]             ADDITIONAL CARE RECOMMENDATIONS:  Follow up with PCP and Hematology    DIET: Resume previous diet    ACTIVITY: Activity as tolerated    DISCHARGE MEDICATIONS:  Current Discharge Medication List      START taking these medications    Details   predniSONE (DELTASONE) 20 mg tablet Take 60 mg by mouth daily (with breakfast) for 14 days. Qty: 42 Tab, Refills: 0      insulin glargine (LANTUS,BASAGLAR) 100 unit/mL (3 mL) inpn 18 units daily QHS 'before bed'. Monitor your sugars regularly. Qty: 1 Pen, Refills: 0      insulin lispro (HUMALOG) 100 unit/mL kwikpen Check sugars 4 times/day, before each meal and before bed time. If sugars 200- 250 give 3 units  If sugars 250- 300 give 5 units  If sugars 300-350 give 7 units  If sugars higher than 350 call your Doctor for advice. If sugars below 100' check with your diabetes education team/your doctor. Qty: 1 Pen, Refills: 0         CONTINUE these medications which have NOT CHANGED    Details   atorvastatin (LIPITOR) 20 mg tablet Take 20 mg by mouth daily. dextroamphetamine-amphetamine (ADDERALL) 30 mg tablet Take 30 mg by mouth two (2) times a day. DULoxetine (CYMBALTA) 30 mg capsule Take 30 mg by mouth daily. levothyroxine (SYNTHROID) 175 mcg tablet Take 175 mcg by mouth Daily (before breakfast). lisinopril-hydroCHLOROthiazide (PRINZIDE, ZESTORETIC) 20-25 mg per tablet Take 1 Tab by mouth daily. metFORMIN (GLUMETZA ER) 500 mg TG24 24 hour tablet Take 500 mg by mouth three (3) times daily. melatonin 5 mg cap capsule Take 10 mg by mouth nightly as needed for Other (insomnia). multivitamin, tx-iron-ca-min (THERA-M W/ IRON) 9 mg iron-400 mcg tab tablet Take 1 Tab by mouth daily.              NOTIFY YOUR PHYSICIAN FOR ANY OF THE FOLLOWING:   Fever over 101 degrees for 24 hours. Chest pain, shortness of breath, fever, chills, nausea, vomiting, diarrhea, change in mentation, falling, weakness, bleeding. Severe pain or pain not relieved by medications. Or, any other signs or symptoms that you may have questions about. DISPOSITION:    Home With:   OT  PT  HH  RN       Long term SNF/Inpatient Rehab   x Independent/assisted living    Hospice    Other:       PATIENT CONDITION AT DISCHARGE:     Functional status    Poor     Deconditioned     Independent      Cognition     Lucid     Forgetful     Dementia      Catheters/lines (plus indication)    Fernandez     PICC     PEG     None      Code status     Full code     DNR      PHYSICAL EXAMINATION AT DISCHARGE:  Patient seen and examined at bedside, Condition stable, explained discharge and follow up plans. General:  Alert, oriented, No acute distress  Resp:  No accessory muscle use, Good AE. Neuro:  Grossly normal, no focal neuro deficits, follows commands   CHRONIC MEDICAL DIAGNOSES:  Problem List as of 2/25/2019 Never Reviewed          Codes Class Noted - Resolved    Thrombocytopenia ([facility] [address] ICD-10-CM: D69.6  ICD-9-CM: 287.5  2/21/2019 - Present        * (Principal) Acute ITP ([facility] [address] ICD-10-CM: D69.3  ICD-9-CM: 287.31  2/21/2019 - Present        Diabetes mellitus type 2, controlled ([facility] [address] ICD-10-CM: E11.9  ICD-9-CM: 250.00  2/21/2019 - Present        Chronic left shoulder pain ICD-10-CM: M25.512, G89.29  ICD-9-CM: 719.41, 338.29  2/21/2019 - Present              37 minutes were spent with the patient on counseling and coordination of care.     Signed:   Chon Rojo MD  2/25/2019  11:28 AM

## 2019-02-25 NOTE — DISCHARGE INSTRUCTIONS
Discharge Instructions       PATIENT ID: Batool Loredo  MRN: [id number]   YOB: 1950    DATE OF ADMISSION: 2/21/2019  3:44 PM    DATE OF DISCHARGE: 2/25/2019    PRIMARY CARE PROVIDER: Ramiro Oconnor MD     ATTENDING PHYSICIAN: Linnea Oliver MD  DISCHARGING PROVIDER: Chon Rojo MD    To contact this individual call [phone] and ask the  to page. If unavailable ask to be transferred the Adult Hospitalist Department. DISCHARGE DIAGNOSES ITP 'low Platelets'    CONSULTATIONS: IP CONSULT TO HEMATOLOGY  IP CONSULT TO HOSPITALIST    PROCEDURES/SURGERIES: * No surgery found *    FOLLOW UP APPOINTMENTS:   Follow-up Information     Follow up With Specialties Details Why Contact Info    Ramiro Oconnor MD [facility] In 1 week  [facility] [address]      Ed Cintron MD Hematology and Oncology, Hematology, Oncology  As scheduled on Thursday 2/28/19 [address] [place] [address] 343             ADDITIONAL CARE RECOMMENDATIONS: follow up with PCP and Hematology    DIET: Resume previous diet    DISCHARGE MEDICATIONS:  Insulin for good diabetes control    · It is important that you take the medication exactly as they are prescribed. · Keep your medication in the bottles provided by the pharmacist and keep a list of the medication names, dosages, and times to be taken in your wallet. · Do not take other medications without consulting your doctor. NOTIFY YOUR PHYSICIAN FOR ANY OF THE FOLLOWING:   Fever over 101 degrees for 24 hours. Chest pain, shortness of breath, fever, chills, nausea, vomiting, diarrhea, change in mentation, falling, weakness, bleeding. Severe pain or pain not relieved by medications. Or, any other signs or symptoms that you may have questions about. It was our pleasure to help take care of you and we hope you get well very soon.     DISPOSITION:    Home With:   OT  PT  Murtaza Kirby RN SNF/Inpatient Rehab/LTAC   x Independent/assisted living    Hospice    Other:     CDMP Checked:   Yes x     PROBLEM LIST Updated:  Yes x     Signed:   Lauren Cedeno MD  2/25/2019  11:27 AM

## 2019-02-25 NOTE — PROGRESS NOTES
Bedside shift change report given to Kimberly Hutchinson (oncoming nurse) by Tanya Fields (offgoing nurse). Report included the following information SBAR.

## 2019-02-25 NOTE — PROGRESS NOTES
80 y.o. WM referred 2/21/2019 for evaluation of thrombocytopenia. Subjective,, no c/o pain, bleeding 2/21/2019 Transfused PLT 1 unit Solumedrol 125 mg Q12 
IVIGG 1 gm /kg 2/24. Yarelis Jozef Platelet count up to 130 k today ROS: 
No gross bleeding 
  
  
ALLERGIES: Pcn [penicillins] 
  
VSS 
NAD No oral/nasal bleeding No adenopathy Lung clear Cor RRR Abd soft non tender Skin diffuse petechae- 
Neuro Non focal 
 
 
Dx: Thrombocytopenia ITP. . S/p IVIG and high dose steroids. Yarelis Jozef Yarelis Jozef Count up to 92k today. .. Diabetes --non insulin dependent Rx: 
Continue glucocorticoids 
 prednisone 60 mg po today. . Pt. Will be able to go home today, I will f/u with him on thursday Zev Ledbetter MD

## 2019-02-28 LAB — CRYOGLOB SER QL 1D COLD INC: NORMAL

## 2021-07-31 NOTE — PROGRESS NOTES
TRANSFER - IN REPORT: 
 
Verbal report received from [facility]) on Eladia Havers  being received from ED(unit) for routine progression of care Report consisted of patients Situation, Background, Assessment and  
Recommendations(SBAR). Information from the following report(s) SBAR, Kardex, ED Summary, Intake/Output, MAR, Recent Results and Med Rec Status was reviewed with the receiving nurse. Opportunity for questions and clarification was provided. Assessment completed upon patients arrival to unit and care assumed. Discussed with RN about checking with blood consent obtained and requested a weight for the patient to be able to hang the patient's IVIG medication. RN verbalized understanding. vomiting

## 2022-03-18 PROBLEM — M25.512 CHRONIC LEFT SHOULDER PAIN: Status: ACTIVE | Noted: 2019-02-21

## 2022-03-18 PROBLEM — G89.29 CHRONIC LEFT SHOULDER PAIN: Status: ACTIVE | Noted: 2019-02-21

## 2022-03-18 PROBLEM — E11.9 DIABETES MELLITUS TYPE 2, CONTROLLED (HCC): Status: ACTIVE | Noted: 2019-02-21

## 2022-03-19 PROBLEM — D69.3 ACUTE ITP (HCC): Status: ACTIVE | Noted: 2019-02-21

## 2022-03-19 PROBLEM — D69.6 THROMBOCYTOPENIA (HCC): Status: ACTIVE | Noted: 2019-02-21

## 2022-12-20 ENCOUNTER — APPOINTMENT (OUTPATIENT)
Dept: CT IMAGING | Age: 72
DRG: 813 | End: 2022-12-20
Attending: NURSE PRACTITIONER
Payer: MEDICARE

## 2022-12-20 ENCOUNTER — HOSPITAL ENCOUNTER (INPATIENT)
Age: 72
LOS: 1 days | Discharge: HOME | DRG: 813 | End: 2022-12-21
Attending: EMERGENCY MEDICINE | Admitting: FAMILY MEDICINE
Payer: MEDICARE

## 2022-12-20 DIAGNOSIS — E11.9 CONTROLLED TYPE 2 DIABETES MELLITUS WITHOUT COMPLICATION, WITHOUT LONG-TERM CURRENT USE OF INSULIN (HCC): ICD-10-CM

## 2022-12-20 DIAGNOSIS — T38.0X5A STEROID-INDUCED HYPERGLYCEMIA: ICD-10-CM

## 2022-12-20 DIAGNOSIS — R73.9 STEROID-INDUCED HYPERGLYCEMIA: ICD-10-CM

## 2022-12-20 DIAGNOSIS — D69.6 THROMBOCYTOPENIA (HCC): Primary | ICD-10-CM

## 2022-12-20 LAB
ABO + RH BLD: NORMAL
ALBUMIN SERPL-MCNC: 3.4 G/DL (ref 3.5–5)
ALBUMIN/GLOB SERPL: 0.9 {RATIO} (ref 1.1–2.2)
ALP SERPL-CCNC: 52 U/L (ref 45–117)
ALT SERPL-CCNC: 26 U/L (ref 12–78)
AMPHET UR QL SCN: POSITIVE
ANION GAP SERPL CALC-SCNC: 6 MMOL/L (ref 5–15)
APPEARANCE UR: ABNORMAL
APTT PPP: 27.8 SEC (ref 22.1–31)
AST SERPL-CCNC: 21 U/L (ref 15–37)
BACTERIA URNS QL MICRO: NEGATIVE /HPF
BARBITURATES UR QL SCN: NEGATIVE
BASOPHILS # BLD: 0 K/UL (ref 0–0.1)
BASOPHILS # BLD: 0 K/UL (ref 0–0.1)
BASOPHILS NFR BLD: 0 % (ref 0–1)
BASOPHILS NFR BLD: 1 % (ref 0–1)
BENZODIAZ UR QL: NEGATIVE
BILIRUB SERPL-MCNC: 0.5 MG/DL (ref 0.2–1)
BILIRUB UR QL: NEGATIVE
BLOOD GROUP ANTIBODIES SERPL: NORMAL
BUN SERPL-MCNC: 33 MG/DL (ref 6–20)
BUN/CREAT SERPL: 26 (ref 12–20)
CALCIUM SERPL-MCNC: 9 MG/DL (ref 8.5–10.1)
CANNABINOIDS UR QL SCN: NEGATIVE
CHLORIDE SERPL-SCNC: 111 MMOL/L (ref 97–108)
CO2 SERPL-SCNC: 25 MMOL/L (ref 21–32)
COCAINE UR QL SCN: NEGATIVE
COLOR UR: ABNORMAL
COMMENT, HOLDF: NORMAL
CREAT SERPL-MCNC: 1.28 MG/DL (ref 0.7–1.3)
DIFFERENTIAL METHOD BLD: ABNORMAL
DIFFERENTIAL METHOD BLD: ABNORMAL
DRUG SCRN COMMENT,DRGCM: ABNORMAL
EOSINOPHIL # BLD: 0.2 K/UL (ref 0–0.4)
EOSINOPHIL # BLD: 0.5 K/UL (ref 0–0.4)
EOSINOPHIL NFR BLD: 12 % (ref 0–7)
EOSINOPHIL NFR BLD: 5 % (ref 0–7)
EPITH CASTS URNS QL MICRO: ABNORMAL /LPF
ERYTHROCYTE [DISTWIDTH] IN BLOOD BY AUTOMATED COUNT: 12.3 % (ref 11.5–14.5)
ERYTHROCYTE [DISTWIDTH] IN BLOOD BY AUTOMATED COUNT: 12.4 % (ref 11.5–14.5)
EST. AVERAGE GLUCOSE BLD GHB EST-MCNC: 134 MG/DL
FERRITIN SERPL-MCNC: 298 NG/ML (ref 26–388)
FIBRINOGEN PPP-MCNC: 330 MG/DL (ref 200–475)
GLOBULIN SER CALC-MCNC: 4 G/DL (ref 2–4)
GLUCOSE BLD STRIP.AUTO-MCNC: 120 MG/DL (ref 65–117)
GLUCOSE BLD STRIP.AUTO-MCNC: 245 MG/DL (ref 65–117)
GLUCOSE SERPL-MCNC: 154 MG/DL (ref 65–100)
GLUCOSE UR STRIP.AUTO-MCNC: NEGATIVE MG/DL
HAV IGM SER QL: NONREACTIVE
HBA1C MFR BLD: 6.3 % (ref 4–5.6)
HBV CORE IGM SER QL: NONREACTIVE
HBV SURFACE AG SER QL: <0.1 INDEX
HBV SURFACE AG SER QL: NEGATIVE
HCT VFR BLD AUTO: 37.8 % (ref 36.6–50.3)
HCT VFR BLD AUTO: 41.2 % (ref 36.6–50.3)
HCV AB SERPL QL IA: NONREACTIVE
HGB BLD-MCNC: 12.8 G/DL (ref 12.1–17)
HGB BLD-MCNC: 13.5 G/DL (ref 12.1–17)
HGB UR QL STRIP: NEGATIVE
HIV 1+2 AB+HIV1 P24 AG SERPL QL IA: NONREACTIVE
HIV12 RESULT COMMENT, HHIVC: NORMAL
IMM GRANULOCYTES # BLD AUTO: 0 K/UL
IMM GRANULOCYTES # BLD AUTO: 0 K/UL
IMM GRANULOCYTES NFR BLD AUTO: 0 %
IMM GRANULOCYTES NFR BLD AUTO: 0 %
INR PPP: 1 (ref 0.9–1.1)
IRON SATN MFR SERPL: 30 % (ref 20–50)
IRON SERPL-MCNC: 94 UG/DL (ref 35–150)
KETONES UR QL STRIP.AUTO: NEGATIVE MG/DL
LEUKOCYTE ESTERASE UR QL STRIP.AUTO: NEGATIVE
LYMPHOCYTES # BLD: 0.5 K/UL (ref 0.8–3.5)
LYMPHOCYTES # BLD: 0.5 K/UL (ref 0.8–3.5)
LYMPHOCYTES NFR BLD: 10 % (ref 12–49)
LYMPHOCYTES NFR BLD: 12 % (ref 12–49)
MCH RBC QN AUTO: 29.5 PG (ref 26–34)
MCH RBC QN AUTO: 29.6 PG (ref 26–34)
MCHC RBC AUTO-ENTMCNC: 32.8 G/DL (ref 30–36.5)
MCHC RBC AUTO-ENTMCNC: 33.9 G/DL (ref 30–36.5)
MCV RBC AUTO: 87.1 FL (ref 80–99)
MCV RBC AUTO: 90.4 FL (ref 80–99)
METHADONE UR QL: NEGATIVE
MONOCYTES # BLD: 0.2 K/UL (ref 0–1)
MONOCYTES # BLD: 0.4 K/UL (ref 0–1)
MONOCYTES NFR BLD: 10 % (ref 5–13)
MONOCYTES NFR BLD: 5 % (ref 5–13)
NEUTS SEG # BLD: 2.9 K/UL (ref 1.8–8)
NEUTS SEG # BLD: 4 K/UL (ref 1.8–8)
NEUTS SEG NFR BLD: 65 % (ref 32–75)
NEUTS SEG NFR BLD: 80 % (ref 32–75)
NITRITE UR QL STRIP.AUTO: NEGATIVE
NRBC # BLD: 0 K/UL (ref 0–0.01)
NRBC # BLD: 0 K/UL (ref 0–0.01)
NRBC BLD-RTO: 0 PER 100 WBC
NRBC BLD-RTO: 0 PER 100 WBC
OPIATES UR QL: NEGATIVE
PATH REV BLD -IMP: ABNORMAL
PCP UR QL: NEGATIVE
PH UR STRIP: 5 [PH] (ref 5–8)
PLATELET # BLD AUTO: 13 K/UL (ref 150–400)
PLATELET # BLD AUTO: 51 K/UL (ref 150–400)
PLATELET COMMENTS,PCOM: ABNORMAL
PMV BLD AUTO: 10.9 FL (ref 8.9–12.9)
POTASSIUM SERPL-SCNC: 4.1 MMOL/L (ref 3.5–5.1)
PROT SERPL-MCNC: 7.4 G/DL (ref 6.4–8.2)
PROT UR STRIP-MCNC: ABNORMAL MG/DL
PROTHROMBIN TIME: 10.3 SEC (ref 9–11.1)
RBC # BLD AUTO: 4.34 M/UL (ref 4.1–5.7)
RBC # BLD AUTO: 4.56 M/UL (ref 4.1–5.7)
RBC #/AREA URNS HPF: ABNORMAL /HPF (ref 0–5)
RBC MORPH BLD: ABNORMAL
RBC MORPH BLD: ABNORMAL
SAMPLES BEING HELD,HOLD: NORMAL
SERVICE CMNT-IMP: ABNORMAL
SERVICE CMNT-IMP: ABNORMAL
SODIUM SERPL-SCNC: 142 MMOL/L (ref 136–145)
SP GR UR REFRACTOMETRY: 1.02 (ref 1–1.03)
SP1: NORMAL
SP2: NORMAL
SP3: NORMAL
SPECIMEN EXP DATE BLD: NORMAL
THERAPEUTIC RANGE,PTTT: NORMAL SECS (ref 58–77)
TIBC SERPL-MCNC: 315 UG/DL (ref 250–450)
TSH SERPL DL<=0.05 MIU/L-ACNC: 2.45 UIU/ML (ref 0.36–3.74)
UR CULT HOLD, URHOLD: NORMAL
URATE CRY URNS QL MICRO: ABNORMAL
UROBILINOGEN UR QL STRIP.AUTO: 0.2 EU/DL (ref 0.2–1)
WBC # BLD AUTO: 4.3 K/UL (ref 4.1–11.1)
WBC # BLD AUTO: 4.9 K/UL (ref 4.1–11.1)
WBC URNS QL MICRO: ABNORMAL /HPF (ref 0–4)

## 2022-12-20 PROCEDURE — 80053 COMPREHEN METABOLIC PANEL: CPT

## 2022-12-20 PROCEDURE — P9035 PLATELET PHERES LEUKOREDUCED: HCPCS

## 2022-12-20 PROCEDURE — 99285 EMERGENCY DEPT VISIT HI MDM: CPT

## 2022-12-20 PROCEDURE — 85025 COMPLETE CBC W/AUTO DIFF WBC: CPT

## 2022-12-20 PROCEDURE — 99233 SBSQ HOSP IP/OBS HIGH 50: CPT | Performed by: CLINICAL NURSE SPECIALIST

## 2022-12-20 PROCEDURE — 87389 HIV-1 AG W/HIV-1&-2 AB AG IA: CPT

## 2022-12-20 PROCEDURE — 93005 ELECTROCARDIOGRAM TRACING: CPT

## 2022-12-20 PROCEDURE — 80307 DRUG TEST PRSMV CHEM ANLYZR: CPT

## 2022-12-20 PROCEDURE — 85730 THROMBOPLASTIN TIME PARTIAL: CPT

## 2022-12-20 PROCEDURE — 87040 BLOOD CULTURE FOR BACTERIA: CPT

## 2022-12-20 PROCEDURE — 84443 ASSAY THYROID STIM HORMONE: CPT

## 2022-12-20 PROCEDURE — 74011000258 HC RX REV CODE- 258: Performed by: EMERGENCY MEDICINE

## 2022-12-20 PROCEDURE — 74176 CT ABD & PELVIS W/O CONTRAST: CPT

## 2022-12-20 PROCEDURE — 83036 HEMOGLOBIN GLYCOSYLATED A1C: CPT

## 2022-12-20 PROCEDURE — 82962 GLUCOSE BLOOD TEST: CPT

## 2022-12-20 PROCEDURE — 86900 BLOOD TYPING SEROLOGIC ABO: CPT

## 2022-12-20 PROCEDURE — 80377 DRUG/SUBSTANCE NOS 7/MORE: CPT

## 2022-12-20 PROCEDURE — 36430 TRANSFUSION BLD/BLD COMPNT: CPT

## 2022-12-20 PROCEDURE — 85384 FIBRINOGEN ACTIVITY: CPT

## 2022-12-20 PROCEDURE — 74011250637 HC RX REV CODE- 250/637: Performed by: NURSE PRACTITIONER

## 2022-12-20 PROCEDURE — 81001 URINALYSIS AUTO W/SCOPE: CPT

## 2022-12-20 PROCEDURE — 36415 COLL VENOUS BLD VENIPUNCTURE: CPT

## 2022-12-20 PROCEDURE — 80074 ACUTE HEPATITIS PANEL: CPT

## 2022-12-20 PROCEDURE — P9100 PATHOGEN TEST FOR PLATELETS: HCPCS

## 2022-12-20 PROCEDURE — 83540 ASSAY OF IRON: CPT

## 2022-12-20 PROCEDURE — 85610 PROTHROMBIN TIME: CPT

## 2022-12-20 PROCEDURE — 74011250636 HC RX REV CODE- 250/636: Performed by: EMERGENCY MEDICINE

## 2022-12-20 PROCEDURE — 65270000046 HC RM TELEMETRY

## 2022-12-20 PROCEDURE — 30233N1 TRANSFUSION OF NONAUTOLOGOUS RED BLOOD CELLS INTO PERIPHERAL VEIN, PERCUTANEOUS APPROACH: ICD-10-PCS | Performed by: NURSE PRACTITIONER

## 2022-12-20 PROCEDURE — 82728 ASSAY OF FERRITIN: CPT

## 2022-12-20 PROCEDURE — 74011636637 HC RX REV CODE- 636/637: Performed by: NURSE PRACTITIONER

## 2022-12-20 RX ORDER — DULAGLUTIDE 3 MG/.5ML
0.5 INJECTION, SOLUTION SUBCUTANEOUS
COMMUNITY

## 2022-12-20 RX ORDER — INSULIN LISPRO 100 [IU]/ML
INJECTION, SOLUTION INTRAVENOUS; SUBCUTANEOUS
Status: DISCONTINUED | OUTPATIENT
Start: 2022-12-20 | End: 2022-12-21 | Stop reason: HOSPADM

## 2022-12-20 RX ORDER — LISINOPRIL 20 MG/1
20 TABLET ORAL DAILY
COMMUNITY

## 2022-12-20 RX ORDER — ATORVASTATIN CALCIUM 20 MG/1
20 TABLET, FILM COATED ORAL DAILY
Status: DISCONTINUED | OUTPATIENT
Start: 2022-12-21 | End: 2022-12-21 | Stop reason: HOSPADM

## 2022-12-20 RX ORDER — SODIUM CHLORIDE 9 MG/ML
250 INJECTION, SOLUTION INTRAVENOUS AS NEEDED
Status: DISCONTINUED | OUTPATIENT
Start: 2022-12-20 | End: 2022-12-21 | Stop reason: HOSPADM

## 2022-12-20 RX ORDER — LANOLIN ALCOHOL/MO/W.PET/CERES
6 CREAM (GRAM) TOPICAL
Status: DISCONTINUED | OUTPATIENT
Start: 2022-12-20 | End: 2022-12-21 | Stop reason: HOSPADM

## 2022-12-20 RX ORDER — LISINOPRIL 20 MG/1
20 TABLET ORAL DAILY
Status: DISCONTINUED | OUTPATIENT
Start: 2022-12-21 | End: 2022-12-21 | Stop reason: HOSPADM

## 2022-12-20 RX ORDER — METFORMIN HYDROCHLORIDE 500 MG/1
500 TABLET, FILM COATED, EXTENDED RELEASE ORAL 2 TIMES DAILY
COMMUNITY

## 2022-12-20 RX ORDER — MAGNESIUM SULFATE 100 %
4 CRYSTALS MISCELLANEOUS AS NEEDED
Status: DISCONTINUED | OUTPATIENT
Start: 2022-12-20 | End: 2022-12-21 | Stop reason: HOSPADM

## 2022-12-20 RX ORDER — DULOXETIN HYDROCHLORIDE 30 MG/1
30 CAPSULE, DELAYED RELEASE ORAL DAILY
Status: DISCONTINUED | OUTPATIENT
Start: 2022-12-21 | End: 2022-12-20

## 2022-12-20 RX ORDER — INSULIN GLARGINE 100 [IU]/ML
18 INJECTION, SOLUTION SUBCUTANEOUS
Status: DISCONTINUED | OUTPATIENT
Start: 2022-12-20 | End: 2022-12-21

## 2022-12-20 RX ORDER — DEXTROAMPHETAMINE SACCHARATE, AMPHETAMINE ASPARTATE, DEXTROAMPHETAMINE SULFATE AND AMPHETAMINE SULFATE 2.5; 2.5; 2.5; 2.5 MG/1; MG/1; MG/1; MG/1
30 TABLET ORAL 2 TIMES DAILY
Status: DISCONTINUED | OUTPATIENT
Start: 2022-12-20 | End: 2022-12-21 | Stop reason: HOSPADM

## 2022-12-20 RX ADMIN — METHYLPREDNISOLONE SODIUM SUCCINATE 1000 MG: 1 INJECTION, POWDER, LYOPHILIZED, FOR SOLUTION INTRAMUSCULAR; INTRAVENOUS at 14:10

## 2022-12-20 RX ADMIN — Medication 6 MG: at 22:04

## 2022-12-20 RX ADMIN — Medication 2 UNITS: at 22:04

## 2022-12-20 RX ADMIN — INSULIN GLARGINE 18 UNITS: 100 INJECTION, SOLUTION SUBCUTANEOUS at 22:04

## 2022-12-20 NOTE — ED NOTES
Has a final transfer review been performed?  Yes    Reason for Admission: thrombocytopenia  Patient comes from: home  Mental Status: Alert and oriented  ADL:self care  Ambulation:no difficulty  Pertinent Info/Safety Concerns: none  COVID Status: Not Tested  MEWS Score: 1     Vitals:    12/20/22 1503 12/20/22 1505 12/20/22 1510 12/20/22 1515   BP:  (!) 142/75 (!) 156/85 (!) 162/96   Pulse:  68 74 79   Resp:  16 12 15   Temp: 98.4 °F (36.9 °C) 98.4 °F (36.9 °C)  98.5 °F (36.9 °C)   SpO2:  94% 94% 95%     Lines:   Peripheral IV 12/20/22 Left Antecubital (Active)   Site Assessment Clean, dry, & intact 12/20/22 0812   Phlebitis Assessment 0 12/20/22 0812   Infiltration Assessment 0 12/20/22 0812   Dressing Status Clean, dry, & intact 12/20/22 0812   Dressing Type Transparent 12/20/22 0812   Hub Color/Line Status Pink 12/20/22 0812   Action Taken Blood drawn 12/20/22 0812       Peripheral IV 12/20/22 Right Antecubital (Active)   Site Assessment Clean, dry, & intact 12/20/22 1421   Phlebitis Assessment 0 12/20/22 1421   Infiltration Assessment 0 12/20/22 1421   Dressing Status Clean, dry, & intact 12/20/22 1421   Dressing Type Transparent 12/20/22 1421   Hub Color/Line Status Pink 12/20/22 1421      Transport mode:ED stretcher    Aniya Seats  being transferred to 6E(unit) for routine progression of care     \"Notification of etransfer note given to (Name) Hunter Sears (Title) [facility]

## 2022-12-20 NOTE — ED PROVIDER NOTES
Mr. Gavin Singletary is a 80yo male who presents to the ER with complaints of a rash. He said that he tested +1-week ago for COVID-19. He reports that his significant other was feeling unwell. She tested for COVID and tested faintly positive. Therefore, he tested himself. He said that he felt only minimally bad and mostly was asymptomatic. However, he does report a \"faint line\" that showed that he had COVID-19. He reports that over the last day or 2, he has noticed a rash on his legs, arms, and mouth. He reports that he has a history of low platelets. He said this feels similar. He has noticed a number of little spots on his arms and legs and bruises on his bilateral forearms. He has noticed some blood blisters inside of his mouth. He denies any blood in his stool. He denies any changes with his urine. No other bleeding symptoms. He states he has not been on any new medications over the last few months. No chest pain or trouble breathing. No fevers or chills. He denies any other complaints. Past Medical History:   Diagnosis Date    Diabetes ([address]     Elevated cholesterol     Hypertension     Hypothyroid     Low platelet count ([address]        Past Surgical History:   Procedure Laterality Date    HX HERNIA REPAIR  1991    HX ORTHOPAEDIC  04/09/2018    R shoulder replacement         History reviewed. No pertinent family history.     Social History     Socioeconomic History    Marital status:      Spouse name: Not on file    Number of children: Not on file    Years of education: Not on file    Highest education level: Not on file   Occupational History    Not on file   Tobacco Use    Smoking status: Never    Smokeless tobacco: Never   Substance and Sexual Activity    Alcohol use: No    Drug use: Not on file    Sexual activity: Not on file   Other Topics Concern    Not on file   Social History Narrative    Not on file     Social Determinants of Health     Financial Resource Strain: Not on file   Food Insecurity: Not on file   Transportation Needs: Not on file   Physical Activity: Not on file   Stress: Not on file   Social Connections: Not on file   Intimate Partner Violence: Not on file   Housing Stability: Not on file         ALLERGIES: Pcn [penicillins]    Review of Systems   Constitutional:  Negative for chills and fever. HENT:  Negative for rhinorrhea and sore throat. Respiratory:  Negative for cough and shortness of breath. Cardiovascular:  Negative for chest pain. Gastrointestinal:  Negative for abdominal pain, diarrhea, nausea and vomiting. Genitourinary:  Negative for dysuria and hematuria. Musculoskeletal:  Negative for arthralgias and myalgias. Skin:  Positive for rash. Negative for pallor. Neurological:  Negative for dizziness, weakness and light-headedness. All other systems reviewed and are negative. Vitals:    12/20/22 0756   BP: 136/82   Pulse: 95   Resp: 16   Temp: (!) 96.7 °F (35.9 °C)   SpO2: 100%            Physical Exam     Vital signs reviewed. Nursing notes reviewed.     Const:  No acute distress, well developed, well nourished  Head:  Atraumatic, normocephalic  HEENT: Blood blisters on the buccal mucosa in the bilateral inner mouth at the level where his teeth come together  Eyes:  PERRL, conjunctiva normal, no scleral icterus  Neck:  Supple, trachea midline  Cardiovascular: Regular rate  Resp:  No resp distress, no increased work of breathing  Abd:  Soft, non-tender, non-distended  MSK:  No pedal edema, normal ROM  Neuro:  Alert and oriented x3, no cranial nerve defect  Skin: Petechiae of the bilateral arms and legs with areas of bruising on the forearms and elbows very likely resumes contact on tables or desks  Psych: normal mood and affect, behavior is normal, judgement and thought content is normal        MDM     Amount and/or Complexity of Data Reviewed  Clinical lab tests: ordered and reviewed  Review and summarize past medical records: yes    Patient Progress  Patient progress: stable         Procedures        Perfect Serve Consult for Admission  12:19 PM    ED Room Number: PT18/21  Patient Name and age:  Kelsey Coleman 71 y.o.  male  Working Diagnosis:   1. Thrombocytopenia ([address]        COVID-19 Suspicion:  no  Sepsis present:  no  Reassessment needed: no  Code Status:  Full Code  Readmission: no  Isolation Requirements:  no  Recommended Level of Care:  med/surg  Department:[facility] Adult ED - 21   Other:  spoke with [facility]. They will be involved. REcommend solumedrol. Mr. Abdias Peterson is a 80yo male who presents to the ER with complaints of bruising and a rash. Pt. Has a history of ITP. Pt. Found to have a low platelet count. I spoke with Dr. Shan Ferro with [facility]. He recommends IV solumedrol 1000mg IV and admission to the hospital.  Pt.  To be evaluated for admission by the hospitalist.

## 2022-12-20 NOTE — H&P
[facility] Adult  Hospitalist Group  History and Physical    Date of Service:  12/20/2022  Primary Care Provider: Omari Marie MD  Source of information: Chart review    Chief Complaint: Skin Problem      History of Presenting Illness:   Carolyn Yarbrough is a 71 y.o. male with a PMH of Acute ITP 2019, T2DM, HTN, HLD, Anxiety and Hypothyroidism who presented with c/o scattered \"rash\", bruising and bleeding blisters in mouth. Reported he tested positive for COVID x1 week ago and reported symptoms started x2 days ago. The patient denied headache, blurry vision, sore throat, trouble swallowing, trouble with speech, chest pain, SOB, cough, fever, chills, N/V/D, no abd pain, urinary symptoms, constipation, focal or generalized neurological symptoms, falls, injuries,hematemesis, melena, hemoptysis, hematuria, denied starting any new medications and denied any other concerns or problems besides as mentioned above. REVIEW OF SYSTEMS:  A comprehensive review of systems was negative except for that written in the History of Present Illness. Past Medical History:   Diagnosis Date    Diabetes ([facility]     Elevated cholesterol     Hypertension     Hypothyroid     Low platelet count ([facility]       Past Surgical History:   Procedure Laterality Date    HX HERNIA REPAIR  1991    HX ORTHOPAEDIC  04/09/2018    R shoulder replacement     Prior to Admission medications    Medication Sig Start Date End Date Taking? Authorizing Provider   insulin glargine (LANTUS,BASAGLAR) 100 unit/mL (3 mL) inpn 18 units daily QHS 'before bed'. Monitor your sugars regularly. 2/25/19   Tomi Montana MD   insulin lispro (HUMALOG) 100 unit/mL kwikpen Check sugars 4 times/day, before each meal and before bed time. If sugars 200- 250 give 3 units  If sugars 250- 300 give 5 units  If sugars 300-350 give 7 units  If sugars higher than 350 call your Doctor for advice.     If sugars below 100' check with your diabetes education team/your doctor. 2/25/19   Zana Montana MD   atorvastatin (LIPITOR) 20 mg tablet Take 20 mg by mouth daily. Provider, Historical   dextroamphetamine-amphetamine (ADDERALL) 30 mg tablet Take 30 mg by mouth two (2) times a day. Provider, Historical   DULoxetine (CYMBALTA) 30 mg capsule Take 30 mg by mouth daily. Provider, Historical   levothyroxine (SYNTHROID) 175 mcg tablet Take 175 mcg by mouth Daily (before breakfast). Provider, Historical   lisinopril-hydroCHLOROthiazide (PRINZIDE, ZESTORETIC) 20-25 mg per tablet Take 1 Tab by mouth daily. Provider, Historical   metFORMIN (GLUMETZA ER) 500 mg TG24 24 hour tablet Take 500 mg by mouth three (3) times daily. Provider, Historical   multivitamin, tx-iron-ca-min (THERA-M W/ IRON) 9 mg iron-400 mcg tab tablet Take 1 Tab by mouth daily. Provider, Historical   melatonin 5 mg cap capsule Take 10 mg by mouth nightly as needed for Other (insomnia). Provider, Historical     Allergies   Allergen Reactions    Pcn [Penicillins] Hives      History reviewed. No pertinent family history. Social History:  reports that he has never smoked. He has never used smokeless tobacco. He reports that he does not drink alcohol. Social Determinants of Health     Tobacco Use: Low Risk     Smoking Tobacco Use: Never    Smokeless Tobacco Use: Never    Passive Exposure: Not on file   Alcohol Use: Not on file   Financial Resource Strain: Not on file   Food Insecurity: Not on file   Transportation Needs: Not on file   Physical Activity: Not on file   Stress: Not on file   Social Connections: Not on file   Intimate Partner Violence: Not on file   Depression: Not on file   Housing Stability: Not on file        Family and social history were personally reviewed, all pertinent and relevant details are outlined as above.     Objective:   Visit Vitals  BP (!) 155/81 (BP 1 Location: Left upper arm)   Pulse 79   Temp 98.5 °F (36.9 °C)   Resp 14   SpO2 96%      O2 Device: None (Room air)    PHYSICAL EXAM:     Constitutional:  No acute distress, cooperative, pleasant    ENT:  Oral mucosa moist, dark hematomas noted to mucosa. Resp:  CTA bilaterally. No wheezing/rhonchi/rales. No accessory muscle use. CV:  Regular rhythm, normal rate, S1,S2.    GI/:  Soft, non distended, non tender, no guarding, BS present. Voids Freely. Musculoskeletal:  No edema, warm. Neurologic:  Moves all extremities. AAOx3. Skin:  w/d, widespread petechia. Psych:  Good insight, Not anxious nor agitated. Data Review: All diagnostic labs and studies have been reviewed. Abnormal Labs Reviewed   CBC WITH AUTOMATED DIFF - Abnormal; Notable for the following components:       Result Value    PLATELET 13 (*)     EOSINOPHILS 12 (*)     ABS. LYMPHOCYTES 0.5 (*)     ABS.  EOSINOPHILS 0.5 (*)     All other components within normal limits   METABOLIC PANEL, COMPREHENSIVE - Abnormal; Notable for the following components:    Chloride 111 (*)     Glucose 154 (*)     BUN 33 (*)     BUN/Creatinine ratio 26 (*)     eGFR 60 (*)     Albumin 3.4 (*)     A-G Ratio 0.9 (*)     All other components within normal limits       All Micro Results       None            IMAGING:   No orders to display        ECG/ECHO:    Results for orders placed or performed during the hospital encounter of 02/21/19   EKG, 12 LEAD, INITIAL   Result Value Ref Range    Ventricular Rate 101 BPM    Atrial Rate 101 BPM    P-R Interval 178 ms    QRS Duration 92 ms    Q-T Interval 356 ms    QTC Calculation (Bezet) 461 ms    Calculated P Axis 89 degrees    Calculated R Axis -19 degrees    Calculated T Axis 18 degrees    Diagnosis       Sinus tachycardia  Incomplete right bundle branch block  No previous ECGs available  Confirmed by Nia Hooper MD ([id number]) on 2/24/2019 8:38:28 AM          Assessment:   Given the patient's current clinical presentation, there is a high level of concern for decompensation if discharged from the emergency department. Complex decision making was performed, which includes reviewing the patient's available past medical records, laboratory results, and imaging studies. Active Problems: Thrombocytopenia ([address] (2/21/2019)      Plan: Thrombocytopenia: acute on chronic.  - transfuse 2u platelets   - monitor platelets, monitor for bleeding/bruising  - Hematology consult  - transfuse platelets to keep platelets > 50  - PTT, PT/INR. Fibrinogen  - HIV, Hepatitis panel, UDS  - iron panel, uremia level  - CT AP wo  - blood cultures  - U/A C&S    T2DM: ISS ac&hs, hypoglycemic protocol, Hga1c. DM care team.  Anxiety: resume home Adderall. HTN: resume home meds. Hypothyroidism: TSH level. HLD: resume statin. DIET: No diet orders on file   ISOLATION PRECAUTIONS: There are currently no Active Isolations  CODE STATUS: Prior   DVT PROPHYLAXIS: SCDs  FUNCTIONAL STATUS PRIOR TO HOSPITALIZATION: Fully active and ambulatory; able to carry on all self-care without restriction. Ambulatory status/function: By self   EARLY MOBILITY ASSESSMENT: Recommend routine ambulation while hospitalized with the assistance of nursing staff  ANTICIPATED DISCHARGE: Greater than 48 hours. ANTICIPATED DISPOSITION: Home  EMERGENCY CONTACT/SURROGATE DECISION MAKER: Maggy Kan Spouse [phone]    CRITICAL CARE WAS PERFORMED FOR THIS ENCOUNTER: NO.      Signed By: [address] Parag, NP     December 20, 2022         Please note that this dictation may have been completed with Charlene Chambers, the computer voice recognition software. Quite often unanticipated grammatical, syntax, homophones, and other interpretive errors are inadvertently transcribed by the computer software. Please disregard these errors. Please excuse any errors that have escaped final proofreading.

## 2022-12-20 NOTE — CONSULTS
ATSP with isolated thrombocytopenia and prior hx of ITP  Pt examined, chart reviewed  Consult dictated  Alex Dooley MD

## 2022-12-20 NOTE — ED TRIAGE NOTES
Pt with scattered petechiae rash and ecchymotic areas on his arms and legs, +blisters on his mouth, denies headache, denies fever, had a Covid + test, maybe,  last week

## 2022-12-20 NOTE — CONSULTS
[address]    Name:  Frida Chanel  MR#:  [id number]  :  1950  ACCOUNT #:  [de-identified]  DATE OF SERVICE:  2022      HISTORY OF PRESENT ILLNESS:  The patient is a 67-year-old gentleman with a history of ITP, who is seen for hematology evaluation after admission to [facility] with thrombocytopenia. This gentleman has a diagnosis of ITP diagnosed in . I treated him with high-dose steroids plus taper. He was lost to follow up approximately 2 years ago but was in remission at that time. Over that ensuing 2-year time frame, he had no new health problems until 1 week ago when he tested positive for COVID with mild symptoms. On the day of admission, he developed bruising and gum bleeding. He came to the emergency room and laboratories here identified a white count of 4.9, hemoglobin 13.5; however, platelet count of 30,005 was recorded. PAST MEDICAL HISTORY:  Significant for ITP, diabetes, hypercholesterolemia, hypothyroidism. PAST SURGICAL HISTORY:  He has had right shoulder replacement, hernia repair. SOCIAL HISTORY:  He is , has no history of drug or alcohol abuse. FAMILY HISTORY:  Unremarkable. REVIEW OF SYSTEMS:  As noted above, otherwise noncontributory. Pain scale zero. PHYSICAL EXAMINATION:  GENERAL:  He is a pleasant, well-developed, well-nourished male in no apparent distress. VITAL SIGNS:  Temperature 98, pulse 82, /83, respirations 18, O2 saturation 99%. HEENT:  EOMI, PERRLA. Oropharynx without lesions. NECK:  Supple. LUNGS:  Clear. CARDIAC:  Regular rate and rhythm. No murmur. ABDOMEN:  Soft, nontender. No hepatosplenomegaly noted. EXTREMITIES:  No edema. IMPRESSION:  Thrombocytopenia in a gentleman who has a history of idiopathic thrombocytopenic purpura that responded nicely to high-dose steroids 3 years ago. I suspect that the patient's recent COVID infection contributed to a relapse.   His platelet count was 01,385 this morning justifying admission. However, on repeat CBC later today, the platelet count was already up to 53. He has a normal white count and hemoglobin. No other symptoms worrisome for underlying malignancy. He did have a CT scan of the abdomen performed this morning in the ER which was negative for intra-abdominal pathology. At this time, I would recommend IV methylprednisolone 1000 mg daily and if the platelet count is the same or higher tomorrow, transition to oral prednisone at 60 mg daily. This gentleman does have diabetes. He will be at risk for poor glucose control. He does have a primary care physician who has worked closely in the past with him when he was on high-dose steroids previously. We will follow with you on behalf of [place].       Ciara Mack MD      [id number]  D:  12/20/2022 17:17  T:  12/20/2022 18:47  JOB #:  [id number]

## 2022-12-20 NOTE — PROGRESS NOTES
Problem: General Medical Care Plan  Goal: *Vital signs within specified parameters  Outcome: Progressing Towards Goal  Goal: *Labs within defined limits  Outcome: Progressing Towards Goal  Goal: *Absence of infection signs and symptoms  Outcome: Progressing Towards Goal  Goal: *Optimal pain control at patient's stated goal  Outcome: Progressing Towards Goal  Goal: *Skin integrity maintained  Outcome: Progressing Towards Goal

## 2022-12-20 NOTE — DIABETES MGMT
[facility]  DIABETES MANAGEMENT CONSULT    Consulted by Provider for advanced nursing evaluation and care for inpatient blood glucose management. Evaluation and Action Plan   Elan Lynne is a 70 yo male admitted with acute on chronic thrombocytopenia requiring platelet transfusion. T2D - current A1C pending. Noted basal/correction insulin regimen in place - PTA takes Metformin daily and Trulicity once weekly. Management Rationale Action Plan   Medication   Basal needs Using 0.2 units/kg/D based on BMI Continue Lantus 18 units daily   Nutritional needs None ordered    Corrective insulin Using normal  sensitivity  Continue normal sensitivity   Referral []        Outpatient diabetes education  [] Behavioral health  [] Inpatient nutrition services  [] Pharmacy services          Initial Presentation   Elan Lynne is a 71 y.o. male admitted 12/20/22 after experiencing scattered \"rash\", bruising and bleeding blisters in mouth. Reported he tested positive for COVID x1 week ago and reported symptoms started x2 days ago. Hematology consulted/ requiring platelet transfusion. LAB: platelet: 23/ all other labs unremarkable   CXR:  CT abd: IMPRESSION  1. No acute intra-abdominal pathology. 2.  Incidental findings as above  MRI:    HX:   Past Medical History:   Diagnosis Date    Diabetes ([facility] [address]     Elevated cholesterol     Hypertension     Hypothyroid     Low platelet count ([facility])         INITIAL DX: Thrombocytopenia ([facility] [D69.6]     Current Treatment     TX: platelet transfusion/ HIV/UDS labs/ blood cultures pending/ UA C&S pending. Hospital Course   Clinical progress has been complicated by thrombocytopenia acute on chronic/ requiring platelet transfusion/ hematology consult. Diabetes History   T2Dx 5yrs - Current A1C pending, prior A1C on file from 2019->7%. Noted diabetes DSMES 2017. Dr. Stan Elena with [address]. Last seen 2 mo ago. Diabetes-related Medical History  Other associated conditions     HTN/ elevated cholesterol/ hypothyroid    Diabetes Medication History: Verifed that he takes Metformin 4 times daily, 279NB ea/ and Trulicity weekly (been on trulicity x1 yr) has attained 20+lb weight loss with it and better BG numbers per his report-  Key Antihyperglycemic Medications               metFORMIN (GLUMETZA ER) 500 mg TG24 24 hour tablet Take 500 mg by mouth three (3) times daily. Diabetes self-management practices:   Eating pattern-   [] Breakfast  Dhillon/eggs/ scrapple  [] Lunch   My wife makes me eat a sandwich  [] Lear March -sometimes spaghetti/pizza etc    Physical activity pattern-not discussed     Monitoring pattern-once daily - usually in AM      Taking medications pattern  [x] Consistent administration  [x] Affordable  Social determinants of health impacting diabetes self-management practices   Concerned that you need to know more about how to stay healthy with diabetes  Overall evaluation:    [] Achieving A1c target with drug therapy & self-care practices    Subjective   I went to diabetes classes down here a while back.      Objective   Physical exam  General Normal weight male in no acute distress. Conversant and cooperative  Neuro  Alert, oriented   Vital Signs Visit Vitals  BP (!) 162/96 (BP 1 Location: Right upper arm, BP Patient Position: At rest)   Pulse 79   Temp 98.5 °F (36.9 °C)   Resp 15   SpO2 95%     Skin  Warm and dry. Heart   Regular rate and rhythm.  No murmurs, rubs or gallops  Lungs  Clear to auscultation without rales or rhonchi  Extremities No foot wounds        Laboratory  Recent Labs     12/20/22  0812   *   AGAP 6   WBC 4.3   CREA 1.28   AST 21   ALT 26       Factors impacting BG management  Factor Dose Comments   Nutrition:  Standard meals     60 grams/meal      Drugs:  Blood transfusion(s)    Steroid     Platelet transfusion 12/20    Methylprednisolone 1000mg x1 dose Other:   Kidney function  Liver function     eGFR >60  WNL      Blood glucose pattern  ADmittig lab 154  POC BG checks ordered  Significant diabetes-related events over the past 24-72 hours      Assessment and Nursing Intervention   Nursing Diagnosis Risk for unstable blood glucose pattern   Nursing Intervention Domain 5250 Decision-making Support   Nursing Interventions Examined current inpatient diabetes/blood glucose control   Explored factors facilitating and impeding inpatient management  Explored corrective strategies with patient and responsible inpatient provider   Informed patient of rational for insulin strategy while hospitalized     Nursing Diagnosis 65569 Ineffective Health Management   Nursing Intervention Domain 5250 Decision-making Support   Nursing Interventions Identified diabetes self-management practices impeding diabetes control  Discussed diabetes survival skills related to  Healthy Plate eating plan; given handouts  Role of physical activity in improving insulin sensitivity and action  Procedure for blood glucose monitoring & options for low-cost products  Medications plan at discharge     Billing Code(s)   93302     Before making these care recommendations, I personally reviewed the hospitalization record, including notes, laboratory & diagnostic data and current medications, and examined the patient at the bedside (circumstances permitting) before making care recommendations. More than fifty (50) percent of the time was spent in patient counseling and/or care coordination.   Total minutes: [address] Elissa Skiff, CNS  Diabetes Clinical Nurse Specialist  [facility]  Access via [address] [place] [address]

## 2022-12-20 NOTE — PROGRESS NOTES
Admission Medication Reconciliation:    Information obtained from: Patient interview, chart review; Recent prescription fill history   RxQuery data available¹:  YES    Comments/Recommendations: Updated PTA meds/reviewed patient's allergies. Medication changes (since last review): Added  - Lisinopril 06BI QAM  - Trulicity 8SS/5.9XI every week on     Adjusted  - Levothyroxine 150mcg QAM  - Metformin 1000mg ER QAM, 500mg ER at noon and evening    Removed  - duloxetine  - lantus  - humalog  - lisinopril/HCTZ  - Melatonin  - Thera-M w/ iron     ¹RxQuery pharmacy benefit data reflects medications filled and processed through the patient's insurance, however   this data does NOT capture whether the medication was picked up or is currently being taken by the patient. Allergies:  Pcn [penicillins]    Significant PMH/Disease States:   Past Medical History:   Diagnosis Date    Diabetes ([facility]     Elevated cholesterol     Hypertension     Hypothyroid     Low platelet count ([facility]      Chief Complaint for this Admission:    Chief Complaint   Patient presents with    Skin Problem     Prior to Admission Medications:   Prior to Admission Medications   Prescriptions Last Dose Informant Taking?   atorvastatin (LIPITOR) 20 mg tablet 2022 Self Yes   Sig: Take 20 mg by mouth daily. dextroamphetamine-amphetamine (ADDERALL) 30 mg tablet 2022 Self Yes   Sig: Take 30 mg by mouth two (2) times a day. dulaglutide (Trulicity) 3 GI/3.2 mL pnij 12/15/2022 Self Yes   Si.5 mL by SubCUTAneous route every seven (7) days. Inject 0.5mL every Thursday. levothyroxine (SYNTHROID) 150 mcg tablet 2022 Self Yes   Sig: Take 150 mcg by mouth Daily (before breakfast). lisinopriL (PRINIVIL, ZESTRIL) 20 mg tablet 2022 Self Yes   Sig: Take 20 mg by mouth daily. [facility] ER) 500 mg TG24 24 hour tablet 2022 Self Yes   Sig: Take 1,000 mg by mouth daily.  Morning dose   metFORMIN (GLUMETZA ER) 500 mg TG24 24 hour tablet 12/19/2022 Self Yes   Sig: Take 500 mg by mouth two (2) times a day. Noon and evening doses. Facility-Administered Medications: None     Please contact the main inpatient pharmacy with any questions or concerns at [phone] and we will direct you to the clinical pharmacist covering this patient's care while in-house.    [employer]

## 2022-12-21 VITALS
TEMPERATURE: 98.3 F | OXYGEN SATURATION: 98 % | DIASTOLIC BLOOD PRESSURE: 85 MMHG | HEART RATE: 97 BPM | RESPIRATION RATE: 16 BRPM | SYSTOLIC BLOOD PRESSURE: 152 MMHG

## 2022-12-21 LAB
ALBUMIN SERPL-MCNC: 3.1 G/DL (ref 3.5–5)
ALBUMIN/GLOB SERPL: 0.9 {RATIO} (ref 1.1–2.2)
ALP SERPL-CCNC: 49 U/L (ref 45–117)
ALT SERPL-CCNC: 23 U/L (ref 12–78)
ANION GAP SERPL CALC-SCNC: 9 MMOL/L (ref 5–15)
AST SERPL-CCNC: 15 U/L (ref 15–37)
BASOPHILS # BLD: 0 K/UL (ref 0–0.1)
BASOPHILS NFR BLD: 0 % (ref 0–1)
BILIRUB SERPL-MCNC: 0.4 MG/DL (ref 0.2–1)
BLD PROD TYP BPU: NORMAL
BLD PROD TYP BPU: NORMAL
BPU ID: NORMAL
BPU ID: NORMAL
BUN SERPL-MCNC: 34 MG/DL (ref 6–20)
BUN/CREAT SERPL: 27 (ref 12–20)
CALCIUM SERPL-MCNC: 8.8 MG/DL (ref 8.5–10.1)
CHLORIDE SERPL-SCNC: 106 MMOL/L (ref 97–108)
CO2 SERPL-SCNC: 23 MMOL/L (ref 21–32)
CREAT SERPL-MCNC: 1.27 MG/DL (ref 0.7–1.3)
DIFFERENTIAL METHOD BLD: ABNORMAL
EOSINOPHIL # BLD: 0 K/UL (ref 0–0.4)
EOSINOPHIL NFR BLD: 1 % (ref 0–7)
ERYTHROCYTE [DISTWIDTH] IN BLOOD BY AUTOMATED COUNT: 11.9 % (ref 11.5–14.5)
GLOBULIN SER CALC-MCNC: 3.4 G/DL (ref 2–4)
GLUCOSE BLD STRIP.AUTO-MCNC: 229 MG/DL (ref 65–117)
GLUCOSE BLD STRIP.AUTO-MCNC: 246 MG/DL (ref 65–117)
GLUCOSE BLD STRIP.AUTO-MCNC: 284 MG/DL (ref 65–117)
GLUCOSE SERPL-MCNC: 232 MG/DL (ref 65–100)
HCT VFR BLD AUTO: 35.9 % (ref 36.6–50.3)
HGB BLD-MCNC: 12.3 G/DL (ref 12.1–17)
IMM GRANULOCYTES # BLD AUTO: 0 K/UL
IMM GRANULOCYTES NFR BLD AUTO: 0 %
LYMPHOCYTES # BLD: 0.8 K/UL (ref 0.8–3.5)
LYMPHOCYTES NFR BLD: 16 % (ref 12–49)
MCH RBC QN AUTO: 30.2 PG (ref 26–34)
MCHC RBC AUTO-ENTMCNC: 34.3 G/DL (ref 30–36.5)
MCV RBC AUTO: 88.2 FL (ref 80–99)
MONOCYTES # BLD: 0.2 K/UL (ref 0–1)
MONOCYTES NFR BLD: 4 % (ref 5–13)
NEUTS BAND NFR BLD MANUAL: 5 % (ref 0–6)
NEUTS SEG # BLD: 3.7 K/UL (ref 1.8–8)
NEUTS SEG NFR BLD: 74 % (ref 32–75)
NRBC # BLD: 0 K/UL (ref 0–0.01)
NRBC BLD-RTO: 0 PER 100 WBC
PLATELET # BLD AUTO: 54 K/UL (ref 150–400)
PMV BLD AUTO: 10.9 FL (ref 8.9–12.9)
POTASSIUM SERPL-SCNC: 4.8 MMOL/L (ref 3.5–5.1)
PROT SERPL-MCNC: 6.5 G/DL (ref 6.4–8.2)
RBC # BLD AUTO: 4.07 M/UL (ref 4.1–5.7)
RBC MORPH BLD: ABNORMAL
SERVICE CMNT-IMP: ABNORMAL
SODIUM SERPL-SCNC: 138 MMOL/L (ref 136–145)
STATUS OF UNIT,%ST: NORMAL
STATUS OF UNIT,%ST: NORMAL
UNIT DIVISION, %UDIV: 0
UNIT DIVISION, %UDIV: 0
WBC # BLD AUTO: 4.7 K/UL (ref 4.1–11.1)

## 2022-12-21 PROCEDURE — 74011250637 HC RX REV CODE- 250/637: Performed by: NURSE PRACTITIONER

## 2022-12-21 PROCEDURE — 74011636637 HC RX REV CODE- 636/637: Performed by: NURSE PRACTITIONER

## 2022-12-21 PROCEDURE — 80053 COMPREHEN METABOLIC PANEL: CPT

## 2022-12-21 PROCEDURE — 99232 SBSQ HOSP IP/OBS MODERATE 35: CPT | Performed by: CLINICAL NURSE SPECIALIST

## 2022-12-21 PROCEDURE — 74011636637 HC RX REV CODE- 636/637: Performed by: STUDENT IN AN ORGANIZED HEALTH CARE EDUCATION/TRAINING PROGRAM

## 2022-12-21 PROCEDURE — 85025 COMPLETE CBC W/AUTO DIFF WBC: CPT

## 2022-12-21 PROCEDURE — 36415 COLL VENOUS BLD VENIPUNCTURE: CPT

## 2022-12-21 PROCEDURE — 82962 GLUCOSE BLOOD TEST: CPT

## 2022-12-21 RX ORDER — INSULIN GLARGINE 100 [IU]/ML
18 INJECTION, SOLUTION SUBCUTANEOUS
Status: DISCONTINUED | OUTPATIENT
Start: 2022-12-21 | End: 2022-12-21 | Stop reason: HOSPADM

## 2022-12-21 RX ORDER — PREDNISONE 20 MG/1
60 TABLET ORAL
Qty: 84 TABLET | Refills: 0 | Status: SHIPPED | OUTPATIENT
Start: 2022-12-21 | End: 2023-01-18

## 2022-12-21 RX ADMIN — DEXTROAMPHETAMINE SACCHARATE, AMPHETAMINE ASPARTATE, DEXTROAMPHETAMINE SULFATE, AMPHETAMINE SULFATE TABLETS, 10 MG,CLL 30 MG: 2.5; 2.5; 2.5; 2.5 TABLET ORAL at 14:04

## 2022-12-21 RX ADMIN — INSULIN GLARGINE 18 UNITS: 100 INJECTION, SOLUTION SUBCUTANEOUS at 17:03

## 2022-12-21 RX ADMIN — PREDNISONE 60 MG: 10 TABLET ORAL at 09:42

## 2022-12-21 RX ADMIN — Medication 3 UNITS: at 17:02

## 2022-12-21 RX ADMIN — LEVOTHYROXINE SODIUM 175 MCG: 0.15 TABLET ORAL at 07:02

## 2022-12-21 RX ADMIN — ATORVASTATIN CALCIUM 20 MG: 20 TABLET, FILM COATED ORAL at 09:42

## 2022-12-21 RX ADMIN — Medication 5 UNITS: at 11:49

## 2022-12-21 RX ADMIN — Medication 3 UNITS: at 07:02

## 2022-12-21 RX ADMIN — DEXTROAMPHETAMINE SACCHARATE, AMPHETAMINE ASPARTATE, DEXTROAMPHETAMINE SULFATE, AMPHETAMINE SULFATE TABLETS, 10 MG,CLL 30 MG: 2.5; 2.5; 2.5; 2.5 TABLET ORAL at 07:19

## 2022-12-21 RX ADMIN — LISINOPRIL 20 MG: 20 TABLET ORAL at 09:42

## 2022-12-21 NOTE — PROGRESS NOTES
Bedside shift change report given to Carmen Pugh (oncoming nurse) by Padilla Akbar RN (offgoing nurse). Report included the following information SBAR, Kardex, MAR, Recent Results, and Cardiac Rhythm NSR .

## 2022-12-21 NOTE — PROGRESS NOTES
\  [address] with wife. No needs identified. Reason for Admission:  low platelets                     RUR Score:       8%              Plan for utilizing home health:      Not needed. PCP: First and Last name:  Last Urbina MD     Name of Practice:    Are you a current patient: Yes/No:    Approximate date of last visit:    Can you participate in a virtual visit with your PCP:                     Current Advanced Directive/Advance Care Plan: Full Code      Healthcare Decision Maker:   Click here to complete [address] including selection of the Healthcare Decision Maker Relationship (ie \"Primary\")                             Transition of Care Plan:    CM met with pt to introduce him to the role of CM and transition of care. This pt lives with his wife. He is independent with all of his ADL's and IADL's. . He is active in the community and a . CM will follow for d/c needs. [address] Management Interventions  PCP Verified by CM:  Yes  Palliative Care Criteria Met (RRAT>21 & CHF Dx)?: No  Transition of Care Consult (CM Consult): Discharge Planning  Physical Therapy Consult: No  Occupational Therapy Consult: No  Speech Therapy Consult: No  Support Systems: Spouse/Significant Other  Confirm Follow Up Transport: Family  The Plan for Transition of Care is Related to the Following Treatment Goals : safe d/c  The Patient and/or Patient Representative was Provided with a Choice of Provider and Agrees with the Discharge Plan?: Yes  Freedom of Choice List was Provided with Basic Dialogue that Supports the Patient's Individualized Plan of Care/Goals, Treatment Preferences and Shares the Quality Data Associated with the Providers?: Yes   Resource Information Provided?: No  Discharge Location  Patient Expects to be Discharged to[de-identified] Home

## 2022-12-21 NOTE — PROGRESS NOTES
Pt is laying in the bed with even and unlabored respirations on room air. No complaints of pain at this time. No distress noted. AVS was given to the pt and explained. All questions were answered at this time. IV's were removed with little to no bleeding noted. Belongings were collected and returned to the pt. Pts wife is driving the pt home. Safety precautions are in place. Problem: General Medical Care Plan  Goal: *Vital signs within specified parameters  Outcome: Resolved/Met  Goal: *Labs within defined limits  Outcome: Resolved/Met  Goal: *Absence of infection signs and symptoms  Outcome: Resolved/Met  Goal: *Optimal pain control at patient's stated goal  Outcome: Resolved/Met  Goal: *Skin integrity maintained  Outcome: Resolved/Met  Goal: *Fluid volume balance  Outcome: Resolved/Met  Goal: *Optimize nutritional status  Outcome: Resolved/Met  Goal: *Anxiety reduced or absent  Outcome: Resolved/Met  Goal: *Progressive mobility and function (eg: ADL's)  Outcome: Resolved/Met     Problem: Patient Education: Go to Patient Education Activity  Goal: Patient/Family Education  Outcome: Resolved/Met     Problem: Falls - Risk of  Goal: *Absence of Falls  Description: Document Janette Fall Risk and appropriate interventions in the flowsheet.   Outcome: Resolved/Met     Problem: Patient Education: Go to Patient Education Activity  Goal: Patient/Family Education  Outcome: Resolved/Met

## 2022-12-21 NOTE — ACP (ADVANCE CARE PLANNING)
Advance Care Planning     Advance Care Planning (ACP) Physician/NP/PA Conversation      Date of Conversation: 12/20/2022  Conducted with: Patient with Decision Making Capacity    Healthcare Decision Maker:   Varghese Villafuerte (spouse)  Today we documented Decision Maker(s) consistent with Legal Next of Kin hierarchy. Care Preferences:    Hospitalization: \"If your health worsens and it becomes clear that your chance of recovery is unlikely, what would be your preference regarding hospitalization? \"  The patient would prefer hospitalization. Ventilation: \"If you were unable to breathe on your own and your chance of recovery was unlikely, what would be your preference about the use of a ventilator (breathing machine) if it was available to you? \"   The patient would desire the use of a ventilator. Resuscitation: \"In the event your heart stopped as a result of an underlying serious health condition, would you want attempts to be made to restart your heart, or would you prefer a natural death? \"   Yes, attempt to resuscitate.     Additional topics discussed: treatment goals, benefit/burden of treatment options, ventilation preferences, hospitalization preferences, and resuscitation preferences    Conversation Outcomes / Follow-Up Plan:   ACP complete - no further action today  Reviewed DNR/DNI and patient elects Full Code (Attempt Resuscitation)     Length of Voluntary ACP Conversation in minutes:  16 minutes    Handy Carver MD

## 2022-12-21 NOTE — DISCHARGE SUMMARY
Discharge Summary       PATIENT ID: Marley Eagle  MRN: [id number]   YOB: 1950    DATE OF ADMISSION: 12/20/2022  8:49 AM    DATE OF DISCHARGE: 12/21/22    PRIMARY CARE PROVIDER: Armen Rob MD     ATTENDING PHYSICIAN: Vinay Matt MD   DISCHARGING PROVIDER: Vinay Matt MD    To contact this individual call [phone] and ask the  to page. If unavailable ask to be transferred the Adult Hospitalist Department. CONSULTATIONS: IP CONSULT TO HEMATOLOGY  IP CONSULT TO HEMATOLOGY    PROCEDURES/SURGERIES: * No surgery found *    [address] [facility] COURSE:   Radha Patrick is a 71 y.o. male with a PMH of Acute ITP 2019, T2DM, HTN, HLD, Anxiety and Hypothyroidism who presented with c/o scattered \"rash\", bruising and bleeding blisters in mouth. Reported he tested positive for COVID x1 week ago and reported symptoms started x2 days ago. \"        DISCHARGE DIAGNOSES / PLAN:      Thrombocytopenia ITP: acute on chronic.  - s/p transfuse 2u platelets platelets remain stable >50   - monitor platelets, monitor for bleeding/bruising  - Hematology consult, apprecite recs status post 1000 mg Solu-Medrol, continue with 60 mg prednisone  - Patient to follow-up platelet counts outpatient no further signs and symptoms of bleeding  -Coags not consistent with DIC  - HIV, Hepatitis panel, UDS neg  - CT AP wo neg  - blood cultures ngtd  - U/A neg     T2DM: ISS ac&hs, hypoglycemic protocol, . DM care team consulted appreciate, recs cont home regimen instructed to keep glucose log follow-up with PCP for adjustments  Anxiety: resume home Adderall. HTN: resume home meds. Hypothyroidism: TSH level. HLD: resume statin. DC home. Fu with heme/oncology, pcp op.           FOLLOW UP APPOINTMENTS:    Follow-up Information       Follow up With Specialties Details Why Contact Info    Armen Rob MD Family Medicine Call in 1 day(s)  [phone] [facility] [address] [facility]      Braden Pineda MD Hematology and Oncology, Hematology Physician, Oncology Call in 1 day(s)  [address] [phone]               ADDITIONAL CARE RECOMMENDATIONS: Repeat CBC, BMP 3 to 5 days    DIET: Resume previous diet    ACTIVITY: Activity as tolerated      DISCHARGE MEDICATIONS:  Current Discharge Medication List        START taking these medications    Details   predniSONE (DELTASONE) 20 mg tablet Take 3 Tablets by mouth daily (with breakfast) for 28 days. Qty: 84 Tablet, Refills: 0  Start date: 12/21/2022, End date: 1/18/2023           CONTINUE these medications which have NOT CHANGED    Details   !! metFORMIN (GLUMETZA ER) 500 mg TG24 24 hour tablet Take 500 mg by mouth two (2) times a day. Noon and evening doses. lisinopriL (PRINIVIL, ZESTRIL) 20 mg tablet Take 20 mg by mouth daily. dulaglutide (Trulicity) 3 YV/7.4 mL pnij 0.5 mL by SubCUTAneous route every seven (7) days. Inject 0.5mL every Thursday. atorvastatin (LIPITOR) 20 mg tablet Take 20 mg by mouth daily. dextroamphetamine-amphetamine (ADDERALL) 30 mg tablet Take 30 mg by mouth two (2) times a day. levothyroxine (SYNTHROID) 150 mcg tablet Take 150 mcg by mouth Daily (before breakfast). !! metFORMIN (GLUMETZA ER) 500 mg TG24 24 hour tablet Take 1,000 mg by mouth daily. Morning dose       !! - Potential duplicate medications found. Please discuss with provider. NOTIFY YOUR PHYSICIAN FOR ANY OF THE FOLLOWING:   Fever over 101 degrees for 24 hours. Chest pain, shortness of breath, fever, chills, nausea, vomiting, diarrhea, change in mentation, falling, weakness, bleeding. Severe pain or pain not relieved by medications. Or, any other signs or symptoms that you may have questions about.     DISPOSITION:    Home With:   OT  PT  HH  RN       Long term SNF/Inpatient Rehab   x Independent/assisted living    Hospice    Other:       PATIENT CONDITION AT DISCHARGE:     Functional status    Poor     Deconditioned    x Independent      Cognition    x Lucid     Forgetful     Dementia      Catheters/lines (plus indication)    Fernandez     PICC     PEG    x None      Code status    x Full code     DNR      PHYSICAL EXAMINATION AT DISCHARGE:  Constitutional:  No acute distress, cooperative, pleasant    ENT:  Oral mucosa moist, dark hematomas noted to mucosa. Resp:  CTA bilaterally. No wheezing/rhonchi/rales. No accessory muscle use. CV:  Regular rhythm, normal rate, S1,S2.    GI/:  Soft, non distended, non tender, no guarding, BS present. Voids Freely. Musculoskeletal:  No edema, warm. Neurologic:  Moves all extremities. AAOx3. Skin:  w/d, widespread petechia. Psych:  Good insight, Not anxious nor agitated.          CHRONIC MEDICAL DIAGNOSES:  Problem List as of 12/21/2022 Never Reviewed            Codes Class Noted - Resolved    Thrombocytopenia ([facility] ICD-10-CM: D69.6  ICD-9-CM: 287.5  2/21/2019 - Present        Acute ITP ([facility] ICD-10-CM: D69.3  ICD-9-CM: 287.31  2/21/2019 - Present        Diabetes mellitus type 2, controlled ([facility] ICD-10-CM: E11.9  ICD-9-CM: 250.00  2/21/2019 - Present        Chronic left shoulder pain ICD-10-CM: M25.512, G89.29  ICD-9-CM: 719.41, 338.29  2/21/2019 - Present           Greater than 35 minutes were spent with the patient on counseling and coordination of care    Signed:   Darylene Keys, MD  12/21/2022  12:15 PM

## 2022-12-21 NOTE — DIABETES MGMT
[facility]  DIABETES MANAGEMENT CONSULT    Consulted by Provider for advanced nursing evaluation and care for inpatient blood glucose management. Evaluation and Action Plan   Madeleine Randle is a 72 yo male, with controlled Type 2 Diabetes on Metformin and Trulicity, admitted with acute thrombocytopenia 1 wk after COVID-19 viral infection. His current A1C is at goal at 6.3% and admission BG was 154. He was given 1000mg Solumedrol yesterday afternoon at 2pm and bedtime glucose max was 245. Lantus 18 units once daily was started at bedtime. Steroids have changed over to high dose oral prednisone- 60mg once daily. His fasting glucose was 229. Since prednisone impacts glucose for 12-15h, may benefit from the use of NPH which mirrors the duration of prednisone. Inpatient BG goal is 140-180. He tells me that he was on about 6 wks of steroids with his last ITP treatment several years ago. He did require insulin therapy with last steroid course. His A1C is lower at this time compared to 3 yrs ago and suspect that will help with glucose max with steroid use. He is discharging likely today and will be difficult to evaluate glucose needs based on the pulse steroid he received the day prior. Patient willing to perform frequent glucose monitoring and have prompt follow up with PCP. Management Rationale Action Plan   Medication   Basal needs Using 0.2 units/kg/D based on BMI     Continue Lantus 18 units daily. Would give a dose today prior to discharge. Nutritional needs None ordered    Corrective insulin Using normal  sensitivity  Continue normal sensitivity- adjust to start at a glucose of 200   Referral  POC glucose ACHS    Consistent carbohydrate diet (60 grams CHO/meal)    Accurate weight       Discharge Recommendations: Will need a FUV with PCP within 1-2 weeks after hospital discharge for ongoing diabetes management     Patient to check BG before meals and bedtime. Will notify provider PCP for BG sustained over 180mg/dl. Patient understands he may require insulin if BG elevated based on steroid dose and duration of treatment. Resume Metformin and Trulicity at PTA doses          Initial Presentation   Aniya Mckeon is a 71 y.o. male who presented to the ED on 12/20/22 after experiencing scattered \"rash\", bruising and bleeding blisters in mouth. Reported he tested positive for COVID x1 week ago and reported symptoms started x2 days ago. LAB: Platelet: 13, All other labs unremarkable   CT abd: IMPRESSION  1. No acute intra-abdominal pathology. 2.  Incidental findings as above  He was given a PLT transfusion and admitted for medical management. HX:   Past Medical History:   Diagnosis Date    Diabetes ([facility]     Elevated cholesterol     Hypertension     Hypothyroid     Low platelet count ([facility])     ITP    INITIAL DX: Thrombocytopenia ([facility] [D69.6]     Current Treatment     TX: Platelet transfusion, pulse steroids    Hospital Course   Clinical progress has been complicated by thrombocytopenia acute on chronic/ requiring platelet transfusion/ hematology consult. 12/20: Admission   Diabetes History   T2Dx 5yrs - Current A1C 6..3%, prior A1C on file from 2019->7%. Participated in diabetes DSMES 2017. Dr. Joel Warner with [address]. Last seen 2 mo ago. Diabetes-related Medical History  Other associated conditions     HTN/ elevated cholesterol/ hypothyroid    Diabetes Medication History: Verifed that he takes Metformin 4 times daily, 478PE ea and Trulicity weekly (been on trulicity x1 yr) has attained 20+lb weight loss with it and better BG numbers per his report-  Key Antihyperglycemic Medications               metFORMIN (GLUMETZA ER) 500 mg TG24 24 hour tablet (Taking) Take 500 mg by mouth two (2) times a day. Noon and evening doses. dulaglutide (Trulicity) 3 SM/9.4 mL pnij (Taking) 0.5 mL by SubCUTAneous route every seven (7) days.  Inject 0.5mL every Thursday. metFORMIN (GLUMETZA ER) 500 mg TG24 24 hour tablet (Taking) Take 1,000 mg by mouth daily. Morning dose             Diabetes self-management practices:   Eating pattern-   [x] Breakfast  Dhillon/eggs/ scrapple  [x] Lunch   My wife makes me eat a sandwich  [x] Dinner   Varies -sometimes spaghetti/pizza etc    Physical activity pattern-not discussed     Monitoring pattern-once daily - usually in AM      Taking medications pattern  [x] Consistent administration  [x] Affordable  Social determinants of health impacting diabetes self-management practices   Concerned that you need to know more about how to stay healthy with diabetes  Overall evaluation:    [x] Achieving A1c target with drug therapy & self-care practices      Subjective   I had to use insulin last time I was on steroids.      Objective   Physical exam  General Normal weight male in no acute distress. Conversant and cooperative  Neuro  Alert, oriented   Vital Signs Visit Vitals  BP (!) 152/85 (BP 1 Location: Right upper arm, BP Patient Position: Sitting)   Pulse 77   Temp 98.3 °F (36.8 °C)   Resp 16   SpO2 98%     Skin  Warm and dry. Heart   Regular rate and rhythm.  No murmurs, rubs or gallops  Lungs  Clear to auscultation without rales or rhonchi  Extremities No foot wounds        Laboratory  Recent Labs     22  0047 22  1636 22  0812   *  --  154*   AGAP 9  --  6   WBC 4.7 4.9 4.3   CREA 1.27  --  1.28   AST 15  --  21   ALT 23  --  26         Factors impacting BG management  Factor Dose Comments   Nutrition:  Standard meals     60 grams/meal      Drugs:  Blood transfusion(s)    Steroid     Platelet transfusion     Methylprednisolone 1000mg x1 dose     Other:   Kidney function  Liver function     eGFR >60  WNL      Blood glucose pattern    Significant diabetes-related events over the past 24-72 hours  A1C 6.3%  Fasting B  Pre-prandial: 120-245  Basal: Lantus 18 units HS  Correction: 5 units since yesterday afternoon  1000mg Solumedrol x1 yesterday, now on prednisone 60mg daily. Assessment and Nursing Intervention   Nursing Diagnosis Risk for unstable blood glucose pattern   Nursing Intervention Domain 5250 Decision-making Support   Nursing Interventions Examined current inpatient diabetes/blood glucose control   Explored factors facilitating and impeding inpatient management  Explored corrective strategies with patient and responsible inpatient provider   Informed patient of rational for insulin strategy while hospitalized     Nursing Diagnosis 67446 Ineffective Health Management   Nursing Intervention Domain 5250 Decision-making Support   Nursing Interventions Identified diabetes self-management practices impeding diabetes control  Discussed diabetes survival skills related to  Healthy Plate eating plan; given handouts  Role of physical activity in improving insulin sensitivity and action  Procedure for blood glucose monitoring & options for low-cost products  Medications plan at discharge     Billing Code(s)   72561    Before making these care recommendations, I personally reviewed the hospitalization record, including notes, laboratory & diagnostic data and current medications, and examined the patient at the bedside (circumstances permitting) before making care recommendations. More than fifty (50) percent of the time was spent in patient counseling and/or care coordination.   Total minutes: 25    DEVON Paulino  Diabetes Clinical Nurse Specialist  [facility]  Access via [employer]

## 2022-12-21 NOTE — CONSULTS
[address]    Name:  Tamara Ballesteros  MR#:  [id number]  :  1950  ACCOUNT #:  [de-identified]  DATE OF SERVICE:  2022      HISTORY OF PRESENT ILLNESS:  The patient is a 78-year-old gentleman with a history of ITP, who is seen for hematology evaluation after admission to [address] [place] [address] with thrombocytopenia. This gentleman has a diagnosis of ITP diagnosed in . I treated him with high-dose steroids plus taper. He was lost to follow up approximately 2 years ago but was in remission at that time. Over that ensuing 2-year time frame, he had no new health problems until 1 week ago when he tested positive for COVID with mild symptoms. On the day of admission, he developed bruising and gum bleeding. He came to the emergency room and laboratories here identified a white count of 4.9, hemoglobin 13.5; however, platelet count of 31,416 was recorded. PAST MEDICAL HISTORY:  Significant for ITP, diabetes, hypercholesterolemia, hypothyroidism. PAST SURGICAL HISTORY:  He has had right shoulder replacement, hernia repair. SOCIAL HISTORY:  He is , has no history of drug or alcohol abuse. FAMILY HISTORY:  Unremarkable. REVIEW OF SYSTEMS:  As noted above, otherwise noncontributory. Pain scale zero. PHYSICAL EXAMINATION:  GENERAL:  He is a pleasant, well-developed, well-nourished male in no apparent distress. VITAL SIGNS:  Temperature 98, pulse 82, /83, respirations 18, O2 saturation 99%. EXTREMITIES:  No edema. IMPRESSION:  Platelets are up to 30,274 and he feel well. Received 60 mg of oral prednisone this am and will go home today and  prednisone at his pharmacy. My office will call him with appt. No active bleeding. We think maybe COVID triggered his ITP to relapse and that the ITP may resolve quickly.      Basilio Maciel MD

## 2022-12-22 LAB
ATRIAL RATE: 73 BPM
CALCULATED P AXIS, ECG09: 102 DEGREES
CALCULATED R AXIS, ECG10: -5 DEGREES
CALCULATED T AXIS, ECG11: 51 DEGREES
DIAGNOSIS, 93000: NORMAL
P-R INTERVAL, ECG05: 162 MS
Q-T INTERVAL, ECG07: 386 MS
QRS DURATION, ECG06: 76 MS
QTC CALCULATION (BEZET), ECG08: 425 MS
VENTRICULAR RATE, ECG03: 73 BPM

## 2022-12-25 LAB
BACTERIA SPEC CULT: NORMAL
SERVICE CMNT-IMP: NORMAL

## 2023-05-01 ENCOUNTER — OFFICE VISIT (OUTPATIENT)
Dept: ORTHOPEDIC SURGERY | Age: 73
End: 2023-05-01

## 2023-05-01 VITALS — WEIGHT: 165 LBS | BODY MASS INDEX: 25.01 KG/M2 | HEIGHT: 68 IN

## 2023-05-01 DIAGNOSIS — M79.642 LEFT HAND PAIN: ICD-10-CM

## 2023-05-01 DIAGNOSIS — M65.352 TRIGGER LITTLE FINGER OF LEFT HAND: Primary | ICD-10-CM

## 2023-05-01 RX ORDER — METHYLPREDNISOLONE 4 MG/1
TABLET ORAL
Qty: 1 DOSE PACK | Refills: 0 | Status: SHIPPED | OUTPATIENT
Start: 2023-05-01 | End: 2023-05-01

## 2023-05-01 RX ORDER — METHYLPREDNISOLONE 4 MG/1
TABLET ORAL
Qty: 1 DOSE PACK | Refills: 0 | Status: SHIPPED | OUTPATIENT
Start: 2023-05-01

## 2023-05-01 NOTE — PATIENT INSTRUCTIONS
Trigger Finger: Care Instructions  Overview  A trigger finger is a finger stuck in a bent position. It happens when the tendon that bends and straightens the thumb or finger can't slide smoothly under the ligaments that hold the tendon against the bones. In most cases, it's caused by a bump (nodule) that forms on the tendon. The bent finger usually straightens out on its own. A trigger finger can be painful. But it normally isn't a serious problem. Trigger fingers seem to occur more in some groups of people. These groups include:  People who have diabetes or arthritis. People who have injured their hands in the past.  Musicians. People who  tools often. Rest and exercises may help your finger relax so that it can bend. You may get a corticosteroid shot. This can reduce swelling and pain. Your doctor may put a splint on your finger. It will give your finger some rest. You may need surgery if the finger keeps locking in a bent position. Follow-up care is a key part of your treatment and safety. Be sure to make and go to all appointments, and call your doctor if you are having problems. It's also a good idea to know your test results and keep a list of the medicines you take. How can you care for yourself at home? If your doctor put a splint on your finger, wear it as directed. Don't take it off until your doctor says you can. You may need to change your activities to avoid movements that irritate the finger. Take your medicines exactly as prescribed. Call your doctor if you think you are having a problem with your medicine. Ask your doctor if you can take an over-the-counter pain medicine, such as acetaminophen (Tylenol), ibuprofen (Advil, Motrin), or naproxen (Aleve). Be safe with medicines. Read and follow all instructions on the label. If your doctor recommends exercises, do them as directed. When should you call for help?    Call your doctor now or seek immediate medical care if:    Your finger locks in a bent position and will not straighten. Watch closely for changes in your health, and be sure to contact your doctor if:    You do not get better as expected. Where can you learn more? Go to http://www.farias.com/  Enter M826 in the search box to learn more about \"Trigger Finger: Care Instructions. \"  Current as of: November 9, 2022               Content Version: 13.6  © 2006-2023 [employer]. Care instructions adapted under license by [employer] (which disclaims liability or warranty for this information). If you have questions about a medical condition or this instruction, always ask your healthcare professional. [address] any warranty or liability for your use of this information.

## 2023-05-06 RX ORDER — METFORMIN HYDROCHLORIDE 500 MG/1
500 TABLET, FILM COATED, EXTENDED RELEASE ORAL 2 TIMES DAILY
COMMUNITY

## 2023-12-06 ENCOUNTER — ANESTHESIA (OUTPATIENT)
Facility: HOSPITAL | Age: 73
End: 2023-12-06
Payer: MEDICARE

## 2023-12-06 ENCOUNTER — HOSPITAL ENCOUNTER (OUTPATIENT)
Facility: HOSPITAL | Age: 73
Setting detail: OUTPATIENT SURGERY
Discharge: HOME | End: 2023-12-06
Attending: INTERNAL MEDICINE | Admitting: INTERNAL MEDICINE
Payer: MEDICARE

## 2023-12-06 ENCOUNTER — ANESTHESIA EVENT (OUTPATIENT)
Facility: HOSPITAL | Age: 73
End: 2023-12-06
Payer: MEDICARE

## 2023-12-06 VITALS
OXYGEN SATURATION: 95 % | RESPIRATION RATE: 16 BRPM | TEMPERATURE: 98.3 F | SYSTOLIC BLOOD PRESSURE: 152 MMHG | DIASTOLIC BLOOD PRESSURE: 77 MMHG | HEART RATE: 84 BPM

## 2023-12-06 PROCEDURE — 88305 TISSUE EXAM BY PATHOLOGIST: CPT

## 2023-12-06 PROCEDURE — 3600007512: Performed by: INTERNAL MEDICINE

## 2023-12-06 PROCEDURE — 6360000002 HC RX W HCPCS: Performed by: NURSE ANESTHETIST, CERTIFIED REGISTERED

## 2023-12-06 PROCEDURE — 3700000001 HC ADD 15 MINUTES (ANESTHESIA): Performed by: INTERNAL MEDICINE

## 2023-12-06 PROCEDURE — 7100000010 HC PHASE II RECOVERY - FIRST 15 MIN: Performed by: INTERNAL MEDICINE

## 2023-12-06 PROCEDURE — 3600007502: Performed by: INTERNAL MEDICINE

## 2023-12-06 PROCEDURE — 3700000000 HC ANESTHESIA ATTENDED CARE: Performed by: INTERNAL MEDICINE

## 2023-12-06 PROCEDURE — 2580000003 HC RX 258: Performed by: INTERNAL MEDICINE

## 2023-12-06 PROCEDURE — 2580000003 HC RX 258: Performed by: NURSE ANESTHETIST, CERTIFIED REGISTERED

## 2023-12-06 PROCEDURE — 2500000003 HC RX 250 WO HCPCS: Performed by: NURSE ANESTHETIST, CERTIFIED REGISTERED

## 2023-12-06 PROCEDURE — 2709999900 HC NON-CHARGEABLE SUPPLY: Performed by: INTERNAL MEDICINE

## 2023-12-06 RX ORDER — SODIUM CHLORIDE 9 MG/ML
25 INJECTION, SOLUTION INTRAVENOUS PRN
Status: DISCONTINUED | OUTPATIENT
Start: 2023-12-06 | End: 2023-12-06 | Stop reason: HOSPADM

## 2023-12-06 RX ORDER — SODIUM CHLORIDE 0.9 % (FLUSH) 0.9 %
5-40 SYRINGE (ML) INJECTION PRN
Status: DISCONTINUED | OUTPATIENT
Start: 2023-12-06 | End: 2023-12-06 | Stop reason: HOSPADM

## 2023-12-06 RX ORDER — 0.9 % SODIUM CHLORIDE 0.9 %
INTRAVENOUS SOLUTION INTRAVENOUS PRN
Status: DISCONTINUED | OUTPATIENT
Start: 2023-12-06 | End: 2023-12-06 | Stop reason: SDUPTHER

## 2023-12-06 RX ORDER — LIDOCAINE HYDROCHLORIDE 20 MG/ML
INJECTION, SOLUTION EPIDURAL; INFILTRATION; INTRACAUDAL; PERINEURAL PRN
Status: DISCONTINUED | OUTPATIENT
Start: 2023-12-06 | End: 2023-12-06 | Stop reason: SDUPTHER

## 2023-12-06 RX ORDER — SODIUM CHLORIDE 0.9 % (FLUSH) 0.9 %
5-40 SYRINGE (ML) INJECTION EVERY 12 HOURS SCHEDULED
Status: DISCONTINUED | OUTPATIENT
Start: 2023-12-06 | End: 2023-12-06 | Stop reason: HOSPADM

## 2023-12-06 RX ADMIN — PROPOFOL 20 MG: 10 INJECTION, EMULSION INTRAVENOUS at 13:10

## 2023-12-06 RX ADMIN — SODIUM CHLORIDE 550 ML: 9 INJECTION, SOLUTION INTRAVENOUS at 13:17

## 2023-12-06 RX ADMIN — PROPOFOL 20 MG: 10 INJECTION, EMULSION INTRAVENOUS at 13:06

## 2023-12-06 RX ADMIN — LIDOCAINE HYDROCHLORIDE 50 MG: 20 INJECTION, SOLUTION EPIDURAL; INFILTRATION; INTRACAUDAL; PERINEURAL at 12:44

## 2023-12-06 RX ADMIN — PROPOFOL 50 MG: 10 INJECTION, EMULSION INTRAVENOUS at 13:02

## 2023-12-06 RX ADMIN — PROPOFOL 20 MG: 10 INJECTION, EMULSION INTRAVENOUS at 13:00

## 2023-12-06 RX ADMIN — SODIUM CHLORIDE 25 ML: 9 INJECTION, SOLUTION INTRAVENOUS at 12:44

## 2023-12-06 RX ADMIN — PROPOFOL 100 MG: 10 INJECTION, EMULSION INTRAVENOUS at 12:44

## 2023-12-06 RX ADMIN — PROPOFOL 40 MG: 10 INJECTION, EMULSION INTRAVENOUS at 12:54

## 2023-12-06 RX ADMIN — PROPOFOL 40 MG: 10 INJECTION, EMULSION INTRAVENOUS at 12:49

## 2023-12-06 ASSESSMENT — PAIN SCALES - GENERAL: PAINLEVEL_OUTOF10: 0

## 2023-12-06 NOTE — ANESTHESIA PRE PROCEDURE
\"LABABO\", \"LABRH\"    Drug/Infectious Status (If Applicable):  Lab Results   Component Value Date/Time    HEPCAB NONREACTIVE 12/20/2022 04:36 PM       COVID-19 Screening (If Applicable): No results found for: \"COVID19\"        Anesthesia Evaluation  Patient summary reviewed and Nursing notes reviewed  Airway: Mallampati: II  TM distance: >3 FB   Neck ROM: full  Mouth opening: > = 3 FB   Dental:    (+) partials and implants      Pulmonary:normal exam                               Cardiovascular:  Exercise tolerance: good (>4 METS)  (+) hypertension: mild, hyperlipidemia        Rhythm: regular  Rate: normal                    Neuro/Psych:               GI/Hepatic/Renal:             Endo/Other:    (+) DiabetesType II DM, poorly controlled, hypothyroidism, blood dyscrasia: thrombocytopenia:. [address] comment: ITP Abdominal: normal exam            Vascular: Other Findings:           Anesthesia Plan      TIVA     ASA 2       Induction: intravenous. continuous noninvasive hemodynamic monitor    Anesthetic plan and risks discussed with patient. Use of blood products discussed with patient whom. Plan discussed with CRNA.     Attending anesthesiologist reviewed and agrees with Salud Messer MD   12/6/2023

## 2023-12-06 NOTE — DISCHARGE INSTRUCTIONS
as instructed by your doctor. Avoid alcoholic beverages for 24 hours or as instructed. IF YOU HAD BIOPSIES TAKEN OR POLYPS REMOVED DURING THE PROCEDURE:  For the next 7 days, avoid all non-steroidal antiinflammatory medications such as Ibuprofen, Motrin, Advil, Alleve, Divine-seltzer, Goody's powder, BC powder. If you do not have an heart condition that requires you to take a daily aspirin, you should avoid taking aspirin for 7 days. Eat a soft diet for 24 hours. Monitor your stools for any blood or dark black, tar-like, stools as this may be a sign of bleeding and if you see any blood, notify your doctor immediately. GET HELP RIGHT AWAY AND SEEK IMMEDIATE MEDICAL CARE IF:  You have more than a spotting of blood in your stool. You pass clumps of tissue (blood clots) or fill the toilet with blood. Your belly is painfully swollen or puffy (abdominal distention). You throw up (vomit). You have a fever. You have redness, pain or swelling at the IV site that last greater than two days. You have abdominal pain or discomfort that is severe or gets worse throughout the day. Post-procedure diagnosis:  See findings from report  - multiple colon polyps, removed  - diverticulosis  - hemorrhoids    Post-procedure recommendations in addition to above:  - Await pathology. You should receive a letter within 2 weeks.    - Resume normal medications.  - Recommend repeat colonoscopy in 3 years  - discussed w/ Stacy Romeo

## 2023-12-06 NOTE — ANESTHESIA POSTPROCEDURE EVALUATION
Department of Anesthesiology  Postprocedure Note    Patient: Gudelia Zhou  MRN: [id number]  YOB: 1950  Date of evaluation: 12/6/2023      Procedure Summary       Date: 12/06/23 Room / Location: [facility] ENDO 02 / [facility] ENDOSCOPY    Anesthesia Start: 2854 Anesthesia Stop: 1319    Procedures:       COLORECTAL CANCER SCREENING, HIGH RISK      COLONOSCOPY POLYPECTOMY SNARE/COLD BIOPSY Diagnosis:       Encounter for screening colonoscopy      Personal history of colonic polyps      Diverticulosis of large intestine without perforation or abscess with bleeding      Colon polyps      (Personal history of colonic polyps [Z86.010])    Surgeons: Bobo Del Castillo MD Responsible Provider: Juni Zayas MD    Anesthesia Type: MAC ASA Status: 2            Anesthesia Type: MAC    Ismael Phase I: Ismael Score: 10    Ismael Phase II: Ismael Score: 10      Anesthesia Post Evaluation    Patient location during evaluation: bedside  Patient participation: complete - patient participated  Level of consciousness: responsive to verbal stimuli and awake and alert  Pain score: 2  Nausea & Vomiting: no nausea  Complications: no  Cardiovascular status: blood pressure returned to baseline  Respiratory status: acceptable  Hydration status: euvolemic  Multimodal analgesia pain management approach  Pain management: adequate

## 2023-12-06 NOTE — PROGRESS NOTES
TRANSFER - IN REPORT:    Verbal report received from thomas on Jd Escudero  being received from endo for routine progression of patient care      Report consisted of patient's Situation, Background, Assessment and   Recommendations(SBAR). Information from the following report(s) Nurse Handoff Report was reviewed with the receiving nurse. Opportunity for questions and clarification was provided. Assessment completed upon patient's arrival to unit and care assumed.

## 2023-12-06 NOTE — PROGRESS NOTES
Endoscopy Case End Note:    Procedure scope was pre-cleaned, per protocol, at bedside by [facility]. Report received from anesthesia. See anesthesia flowsheet for intra-procedure vital signs and events. Belongings returned to patient.

## 2023-12-06 NOTE — H&P
[facility]  Outpatient History and Physical    Patient: Sarahy Franco    Physician: Analy Yoder MD    Vital Signs: There were no vitals taken for this visit. Allergies: Allergies   Allergen Reactions    Penicillins Hives       Chief Complaint: high risk screening hx/o multiple polyps in 2013 with 2yr recall and certified letter sent      History:  Past Medical History:   Diagnosis Date    ADHD (attention deficit hyperactivity disorder)     Diabetes ([address])     Elevated cholesterol     Hypertension     Hypothyroid     Low platelet count (HCC)     Neuropathy due to type 2 diabetes mellitus ([address])     Vitamin D deficiency       Past Surgical History:   Procedure Laterality Date    HERNIA REPAIR  1991    ORTHOPEDIC SURGERY  04/09/2018    R shoulder replacement      Social History     Socioeconomic History    Marital status:      Spouse name: None    Number of children: None    Years of education: None    Highest education level: None   Tobacco Use    Smoking status: Never    Smokeless tobacco: Never   Substance and Sexual Activity    Alcohol use: No      History reviewed. No pertinent family history. Medications:   Prior to Admission medications    Medication Sig Start Date End Date Taking? Authorizing Provider   metFORMIN, MOD, (GLUMETZA) 500 MG extended release tablet Take 1 tablet by mouth 2 times daily    Automatic Reconciliation, Ar   amphetamine-dextroamphetamine (ADDERALL) 30 MG tablet Take 1 tablet by mouth 2 times daily.     Automatic Reconciliation, Ar   atorvastatin (LIPITOR) 20 MG tablet Take 1 tablet by mouth daily    Automatic Reconciliation, Ar   Dulaglutide (TRULICITY) 3 GH/0.7EA SOPN Inject 0.5 mLs into the skin every 7 days    Automatic Reconciliation, Ar   levothyroxine (SYNTHROID) 150 MCG tablet Take 1 tablet by mouth every morning (before breakfast)    Automatic Reconciliation, Ar   lisinopril (PRINIVIL;ZESTRIL) 20 MG tablet Take 1 tablet by mouth

## 2023-12-06 NOTE — OP NOTE
Colonoscopy Procedure Note      Indications:high risk screening colonoscopy hx/o polyps     : Ballard Kussmaul, MD    Staff: Circulator: Jerry Aranda RN  Endoscopy Technician: Vishal Duenas    Referring Provider: Lyndsey Abernathy MD    Sedation:  MAC anesthesia Propofol    Procedure Details:  After informed consent was obtained with all risks and benefits of procedure explained and preoperative exam completed, the patient was taken to the endoscopy suite and placed in the left lateral decubitus position. Upon sequential sedation as per above, a digital rectal exam was performed per below. The Olympus videocolonoscope was inserted in the rectum and carefully advanced to the cecum, which was identified by the ileocecal valve and appendiceal orifice. The quality of preparation was  excellent BPS 9 . The colonoscope was slowly withdrawn with careful evaluation between folds. Retroflexion in the rectum was performed. Findings:   RIANA: small hemorrhoids  Rectum: small hemorrhoids  Sigmoid: mild diverticulosis, otherwise unremarkable  Descending Colon: two sessile polyps ranging 4-6mm removed with cold snare polypectomy, retrieved, minimal bleeding  Transverse Colon: two sessile polyps ranging 2-4mm removed with cold snare polypectomy, retrieved, minimal bleeding  Ascending Colon:one 4mm sessile polyp removed with cold snare polypectomy, retrieved, minimal bleeding  Cecum: normal  no mucosal lesion appreciated     Terminal Ileum: not intubated    Complications: None. EBL:  minimal    Impression:    See Findings above    Recommendations:   - Await pathology. You should receive a letter within 2 weeks. - Resume normal medications.  - Recommend repeat colonoscopy in 3 years  - discussed w/ Karolina    Discharge Disposition:  Home in the company of a  when able to ambulate.     Ballard Kussmaul, MD  12/6/2023  1:18 PM

## 2023-12-06 NOTE — PROGRESS NOTES
ARRIVAL INFORMATION:  Verified patient name and date of birth, scheduled procedure, and informed consent. Belongings with patient include:  Clothing,    GI FOCUSED ASSESSMENT:  Neuro: Awake, alert, oriented x4  Respiratory: even and unlabored   GI: soft and non-distended  EKG Rhythm: normal sinus rhythm    Education:Reviewed general discharge instructions and  information.

## (undated) DEVICE — ELEVIEW SUBMUCOSAL INJECTABLE COMPOSITION 10ML

## (undated) DEVICE — SNARE ENDOSCP L240CM LOOP W27MM SHTH DIA2.4MM WRK CHN 2.8MM

## (undated) DEVICE — NEEDLE SCLERO 25GA L240CM OD0.51MM ID0.24MM EXTN L4MM SHTH